# Patient Record
Sex: MALE | Race: WHITE | NOT HISPANIC OR LATINO | Employment: OTHER | URBAN - METROPOLITAN AREA
[De-identification: names, ages, dates, MRNs, and addresses within clinical notes are randomized per-mention and may not be internally consistent; named-entity substitution may affect disease eponyms.]

---

## 2017-01-12 ENCOUNTER — GENERIC CONVERSION - ENCOUNTER (OUTPATIENT)
Dept: OTHER | Facility: OTHER | Age: 67
End: 2017-01-12

## 2017-02-03 ENCOUNTER — ALLSCRIPTS OFFICE VISIT (OUTPATIENT)
Dept: OTHER | Facility: OTHER | Age: 67
End: 2017-02-03

## 2017-02-03 ENCOUNTER — LAB REQUISITION (OUTPATIENT)
Dept: LAB | Facility: HOSPITAL | Age: 67
End: 2017-02-03
Payer: MEDICARE

## 2017-02-03 DIAGNOSIS — E11.65 TYPE 2 DIABETES MELLITUS WITH HYPERGLYCEMIA (HCC): ICD-10-CM

## 2017-02-03 LAB
EST. AVERAGE GLUCOSE BLD GHB EST-MCNC: 240 MG/DL
HBA1C MFR BLD: 10 % (ref 4.2–6.3)

## 2017-02-03 PROCEDURE — 83036 HEMOGLOBIN GLYCOSYLATED A1C: CPT | Performed by: FAMILY MEDICINE

## 2017-02-04 ENCOUNTER — GENERIC CONVERSION - ENCOUNTER (OUTPATIENT)
Dept: OTHER | Facility: OTHER | Age: 67
End: 2017-02-04

## 2017-02-07 ENCOUNTER — FOLLOW UP (OUTPATIENT)
Dept: URBAN - METROPOLITAN AREA CLINIC 44 | Facility: CLINIC | Age: 67
End: 2017-02-07

## 2017-02-07 DIAGNOSIS — Z79.4: ICD-10-CM

## 2017-02-07 DIAGNOSIS — E11.3313: ICD-10-CM

## 2017-02-07 PROCEDURE — 2026F EYE IMG VALID EVC RTNOPTHY: CPT

## 2017-02-07 PROCEDURE — G8427 DOCREV CUR MEDS BY ELIG CLIN: HCPCS

## 2017-02-07 PROCEDURE — G8397 DIL MACULA/FUNDUS EXAM/W DOC: HCPCS

## 2017-02-07 PROCEDURE — 67028 INJECTION EYE DRUG: CPT

## 2017-02-07 PROCEDURE — 2024F 7 FLD RTA PHOTO EVC RTNOPTHY: CPT

## 2017-02-07 PROCEDURE — 2022F DILAT RTA XM EVC RTNOPTHY: CPT

## 2017-02-07 PROCEDURE — 4040F PNEUMOC VAC/ADMIN/RCVD: CPT

## 2017-02-07 PROCEDURE — 92012 INTRM OPH EXAM EST PATIENT: CPT | Mod: 25

## 2017-02-07 PROCEDURE — 3072F LOW RISK FOR RETINOPATHY: CPT

## 2017-02-07 PROCEDURE — 5010F MACUL RESULT PHY/QHP MNG DM: CPT

## 2017-02-07 PROCEDURE — G8482 FLU IMMUNIZE ORDER/ADMIN: HCPCS

## 2017-02-07 PROCEDURE — 1036F TOBACCO NON-USER: CPT

## 2017-02-07 PROCEDURE — 92134 CPTRZ OPH DX IMG PST SGM RTA: CPT

## 2017-02-07 PROCEDURE — 2021F DILAT MACULAR EXAM DONE: CPT

## 2017-02-07 ASSESSMENT — VISUAL ACUITY
OD_SC: 20/40-1
OS_SC: 20/40-2

## 2017-02-07 ASSESSMENT — TONOMETRY
OS_IOP_MMHG: 20
OD_IOP_MMHG: 20

## 2017-02-14 ENCOUNTER — PROCEDURE ONLY (OUTPATIENT)
Dept: URBAN - METROPOLITAN AREA CLINIC 44 | Facility: CLINIC | Age: 67
End: 2017-02-14

## 2017-02-14 DIAGNOSIS — Z79.4: ICD-10-CM

## 2017-02-14 DIAGNOSIS — E11.3313: ICD-10-CM

## 2017-02-14 PROCEDURE — 67028 INJECTION EYE DRUG: CPT

## 2017-02-14 ASSESSMENT — TONOMETRY: OS_IOP_MMHG: 19

## 2017-02-14 ASSESSMENT — VISUAL ACUITY: OS_SC: 20/40-1

## 2017-02-21 ENCOUNTER — ALLSCRIPTS OFFICE VISIT (OUTPATIENT)
Dept: OTHER | Facility: OTHER | Age: 67
End: 2017-02-21

## 2017-03-21 ENCOUNTER — FOLLOW UP (OUTPATIENT)
Dept: URBAN - METROPOLITAN AREA CLINIC 44 | Facility: CLINIC | Age: 67
End: 2017-03-21

## 2017-03-21 ENCOUNTER — GENERIC CONVERSION - ENCOUNTER (OUTPATIENT)
Dept: OTHER | Facility: OTHER | Age: 67
End: 2017-03-21

## 2017-03-21 DIAGNOSIS — Z96.1: ICD-10-CM

## 2017-03-21 DIAGNOSIS — E11.3313: ICD-10-CM

## 2017-03-21 DIAGNOSIS — H43.393: ICD-10-CM

## 2017-03-21 DIAGNOSIS — Z79.4: ICD-10-CM

## 2017-03-21 PROCEDURE — G8397 DIL MACULA/FUNDUS EXAM/W DOC: HCPCS

## 2017-03-21 PROCEDURE — 2026F EYE IMG VALID EVC RTNOPTHY: CPT

## 2017-03-21 PROCEDURE — 2021F DILAT MACULAR EXAM DONE: CPT

## 2017-03-21 PROCEDURE — G8427 DOCREV CUR MEDS BY ELIG CLIN: HCPCS

## 2017-03-21 PROCEDURE — 92250 FUNDUS PHOTOGRAPHY W/I&R: CPT

## 2017-03-21 PROCEDURE — G8482 FLU IMMUNIZE ORDER/ADMIN: HCPCS

## 2017-03-21 PROCEDURE — 92014 COMPRE OPH EXAM EST PT 1/>: CPT

## 2017-03-21 PROCEDURE — 5010F MACUL RESULT PHY/QHP MNG DM: CPT

## 2017-03-21 PROCEDURE — 1036F TOBACCO NON-USER: CPT

## 2017-03-21 PROCEDURE — 92134 CPTRZ OPH DX IMG PST SGM RTA: CPT

## 2017-03-21 PROCEDURE — 4040F PNEUMOC VAC/ADMIN/RCVD: CPT

## 2017-03-21 PROCEDURE — 3072F LOW RISK FOR RETINOPATHY: CPT

## 2017-03-21 PROCEDURE — 2024F 7 FLD RTA PHOTO EVC RTNOPTHY: CPT

## 2017-03-21 PROCEDURE — 2022F DILAT RTA XM EVC RTNOPTHY: CPT

## 2017-03-21 PROCEDURE — 92235 FLUORESCEIN ANGRPH MLTIFRAME: CPT

## 2017-03-21 ASSESSMENT — TONOMETRY
OS_IOP_MMHG: 19
OD_IOP_MMHG: 19

## 2017-03-21 ASSESSMENT — VISUAL ACUITY
OS_CC: 20/40
OD_CC: 20/60

## 2017-03-28 ENCOUNTER — FOLLOW UP (OUTPATIENT)
Dept: URBAN - METROPOLITAN AREA CLINIC 44 | Facility: CLINIC | Age: 67
End: 2017-03-28

## 2017-03-28 DIAGNOSIS — E11.3313: ICD-10-CM

## 2017-03-28 DIAGNOSIS — Z79.4: ICD-10-CM

## 2017-03-28 PROCEDURE — 67028 INJECTION EYE DRUG: CPT

## 2017-03-28 ASSESSMENT — VISUAL ACUITY: OD_SC: 20/50

## 2017-03-28 ASSESSMENT — TONOMETRY: OD_IOP_MMHG: 14

## 2017-04-04 ENCOUNTER — PROCEDURE ONLY (OUTPATIENT)
Dept: URBAN - METROPOLITAN AREA CLINIC 44 | Facility: CLINIC | Age: 67
End: 2017-04-04

## 2017-04-04 DIAGNOSIS — Z79.4: ICD-10-CM

## 2017-04-04 DIAGNOSIS — E11.3313: ICD-10-CM

## 2017-04-04 PROCEDURE — 67028 INJECTION EYE DRUG: CPT | Mod: GA,LT

## 2017-04-04 ASSESSMENT — VISUAL ACUITY: OS_SC: 20/40-1

## 2017-04-04 ASSESSMENT — TONOMETRY: OS_IOP_MMHG: 15

## 2017-04-25 ENCOUNTER — GENERIC CONVERSION - ENCOUNTER (OUTPATIENT)
Dept: OTHER | Facility: OTHER | Age: 67
End: 2017-04-25

## 2017-05-02 ENCOUNTER — FOLLOW UP (OUTPATIENT)
Dept: URBAN - METROPOLITAN AREA CLINIC 44 | Facility: CLINIC | Age: 67
End: 2017-05-02

## 2017-05-02 DIAGNOSIS — E11.3313: ICD-10-CM

## 2017-05-02 DIAGNOSIS — Z79.4: ICD-10-CM

## 2017-05-02 PROCEDURE — 2021F DILAT MACULAR EXAM DONE: CPT

## 2017-05-02 PROCEDURE — 1036F TOBACCO NON-USER: CPT

## 2017-05-02 PROCEDURE — G8482 FLU IMMUNIZE ORDER/ADMIN: HCPCS

## 2017-05-02 PROCEDURE — 92012 INTRM OPH EXAM EST PATIENT: CPT

## 2017-05-02 PROCEDURE — 2022F DILAT RTA XM EVC RTNOPTHY: CPT

## 2017-05-02 PROCEDURE — G8427 DOCREV CUR MEDS BY ELIG CLIN: HCPCS

## 2017-05-02 PROCEDURE — 2026F EYE IMG VALID EVC RTNOPTHY: CPT

## 2017-05-02 PROCEDURE — 4040F PNEUMOC VAC/ADMIN/RCVD: CPT

## 2017-05-02 PROCEDURE — 5010F MACUL RESULT PHY/QHP MNG DM: CPT

## 2017-05-02 PROCEDURE — G8397 DIL MACULA/FUNDUS EXAM/W DOC: HCPCS

## 2017-05-02 PROCEDURE — 92134 CPTRZ OPH DX IMG PST SGM RTA: CPT

## 2017-05-02 PROCEDURE — 2024F 7 FLD RTA PHOTO EVC RTNOPTHY: CPT

## 2017-05-02 ASSESSMENT — VISUAL ACUITY
OS_SC: 20/40-1
OD_SC: 20/60-1

## 2017-05-02 ASSESSMENT — TONOMETRY
OS_IOP_MMHG: 20
OD_IOP_MMHG: 20

## 2017-05-16 ENCOUNTER — FOLLOW UP (OUTPATIENT)
Dept: URBAN - METROPOLITAN AREA CLINIC 44 | Facility: CLINIC | Age: 67
End: 2017-05-16

## 2017-05-16 DIAGNOSIS — Z79.4: ICD-10-CM

## 2017-05-16 DIAGNOSIS — E11.3313: ICD-10-CM

## 2017-05-16 PROCEDURE — 67028 INJECTION EYE DRUG: CPT

## 2017-05-16 ASSESSMENT — TONOMETRY: OD_IOP_MMHG: 18

## 2017-05-16 ASSESSMENT — VISUAL ACUITY: OD_SC: 20/60+1

## 2017-05-23 ENCOUNTER — FOLLOW UP (OUTPATIENT)
Dept: URBAN - METROPOLITAN AREA CLINIC 44 | Facility: CLINIC | Age: 67
End: 2017-05-23

## 2017-05-23 DIAGNOSIS — Z79.4: ICD-10-CM

## 2017-05-23 DIAGNOSIS — E11.3313: ICD-10-CM

## 2017-05-23 PROCEDURE — 67028 INJECTION EYE DRUG: CPT

## 2017-05-23 ASSESSMENT — TONOMETRY: OS_IOP_MMHG: 20

## 2017-05-23 ASSESSMENT — VISUAL ACUITY: OS_SC: 20/40-2

## 2017-05-24 ENCOUNTER — GENERIC CONVERSION - ENCOUNTER (OUTPATIENT)
Dept: OTHER | Facility: OTHER | Age: 67
End: 2017-05-24

## 2017-06-21 ENCOUNTER — GENERIC CONVERSION - ENCOUNTER (OUTPATIENT)
Dept: OTHER | Facility: OTHER | Age: 67
End: 2017-06-21

## 2017-07-19 ENCOUNTER — GENERIC CONVERSION - ENCOUNTER (OUTPATIENT)
Dept: OTHER | Facility: OTHER | Age: 67
End: 2017-07-19

## 2017-08-01 ENCOUNTER — FOLLOW UP (OUTPATIENT)
Dept: URBAN - METROPOLITAN AREA CLINIC 44 | Facility: CLINIC | Age: 67
End: 2017-08-01

## 2017-08-01 DIAGNOSIS — E11.3313: ICD-10-CM

## 2017-08-01 DIAGNOSIS — Z79.4: ICD-10-CM

## 2017-08-01 PROCEDURE — 2024F 7 FLD RTA PHOTO EVC RTNOPTHY: CPT

## 2017-08-01 PROCEDURE — G8397 DIL MACULA/FUNDUS EXAM/W DOC: HCPCS

## 2017-08-01 PROCEDURE — 2022F DILAT RTA XM EVC RTNOPTHY: CPT

## 2017-08-01 PROCEDURE — 2026F EYE IMG VALID EVC RTNOPTHY: CPT

## 2017-08-01 PROCEDURE — G8482 FLU IMMUNIZE ORDER/ADMIN: HCPCS

## 2017-08-01 PROCEDURE — 67028 INJECTION EYE DRUG: CPT

## 2017-08-01 PROCEDURE — 5010F MACUL RESULT PHY/QHP MNG DM: CPT

## 2017-08-01 PROCEDURE — 92134 CPTRZ OPH DX IMG PST SGM RTA: CPT

## 2017-08-01 PROCEDURE — G8427 DOCREV CUR MEDS BY ELIG CLIN: HCPCS

## 2017-08-01 PROCEDURE — 92012 INTRM OPH EXAM EST PATIENT: CPT | Mod: 25

## 2017-08-01 PROCEDURE — 4040F PNEUMOC VAC/ADMIN/RCVD: CPT

## 2017-08-01 PROCEDURE — 1036F TOBACCO NON-USER: CPT

## 2017-08-01 PROCEDURE — 2021F DILAT MACULAR EXAM DONE: CPT

## 2017-08-01 ASSESSMENT — VISUAL ACUITY
OS_SC: 20/30
OD_SC: 20/60

## 2017-08-01 ASSESSMENT — TONOMETRY
OS_IOP_MMHG: 21
OD_IOP_MMHG: 20

## 2017-08-02 ENCOUNTER — ALLSCRIPTS OFFICE VISIT (OUTPATIENT)
Dept: OTHER | Facility: OTHER | Age: 67
End: 2017-08-02

## 2017-08-02 LAB — HBA1C MFR BLD HPLC: 9.7 %

## 2017-08-07 ENCOUNTER — GENERIC CONVERSION - ENCOUNTER (OUTPATIENT)
Dept: OTHER | Facility: OTHER | Age: 67
End: 2017-08-07

## 2017-08-08 ENCOUNTER — PROCEDURE ONLY (OUTPATIENT)
Dept: URBAN - METROPOLITAN AREA CLINIC 44 | Facility: CLINIC | Age: 67
End: 2017-08-08

## 2017-08-08 DIAGNOSIS — E11.3313: ICD-10-CM

## 2017-08-08 DIAGNOSIS — Z79.4: ICD-10-CM

## 2017-08-08 PROCEDURE — 67028 INJECTION EYE DRUG: CPT

## 2017-08-08 ASSESSMENT — VISUAL ACUITY: OS_SC: 20/40-2

## 2017-08-08 ASSESSMENT — TONOMETRY: OS_IOP_MMHG: 18

## 2017-08-30 ENCOUNTER — GENERIC CONVERSION - ENCOUNTER (OUTPATIENT)
Dept: OTHER | Facility: OTHER | Age: 67
End: 2017-08-30

## 2017-09-07 ENCOUNTER — GENERIC CONVERSION - ENCOUNTER (OUTPATIENT)
Dept: OTHER | Facility: OTHER | Age: 67
End: 2017-09-07

## 2017-09-13 ENCOUNTER — GENERIC CONVERSION - ENCOUNTER (OUTPATIENT)
Dept: OTHER | Facility: OTHER | Age: 67
End: 2017-09-13

## 2017-09-27 ENCOUNTER — GENERIC CONVERSION - ENCOUNTER (OUTPATIENT)
Dept: OTHER | Facility: OTHER | Age: 67
End: 2017-09-27

## 2017-09-28 ENCOUNTER — GENERIC CONVERSION - ENCOUNTER (OUTPATIENT)
Dept: OTHER | Facility: OTHER | Age: 67
End: 2017-09-28

## 2017-10-02 ENCOUNTER — GENERIC CONVERSION - ENCOUNTER (OUTPATIENT)
Dept: OTHER | Facility: OTHER | Age: 67
End: 2017-10-02

## 2017-10-11 ENCOUNTER — GENERIC CONVERSION - ENCOUNTER (OUTPATIENT)
Dept: OTHER | Facility: OTHER | Age: 67
End: 2017-10-11

## 2017-10-16 ENCOUNTER — ALLSCRIPTS OFFICE VISIT (OUTPATIENT)
Dept: OTHER | Facility: OTHER | Age: 67
End: 2017-10-16

## 2017-10-17 ENCOUNTER — FOLLOW UP (OUTPATIENT)
Dept: URBAN - METROPOLITAN AREA CLINIC 44 | Facility: CLINIC | Age: 67
End: 2017-10-17

## 2017-10-17 ENCOUNTER — GENERIC CONVERSION - ENCOUNTER (OUTPATIENT)
Dept: OTHER | Facility: OTHER | Age: 67
End: 2017-10-17

## 2017-10-17 DIAGNOSIS — E11.3313: ICD-10-CM

## 2017-10-17 DIAGNOSIS — H43.393: ICD-10-CM

## 2017-10-17 DIAGNOSIS — Z96.1: ICD-10-CM

## 2017-10-17 DIAGNOSIS — Z79.4: ICD-10-CM

## 2017-10-17 PROCEDURE — 5010F MACUL RESULT PHY/QHP MNG DM: CPT

## 2017-10-17 PROCEDURE — 92014 COMPRE OPH EXAM EST PT 1/>: CPT | Mod: 25

## 2017-10-17 PROCEDURE — 2022F DILAT RTA XM EVC RTNOPTHY: CPT

## 2017-10-17 PROCEDURE — 92235 FLUORESCEIN ANGRPH MLTIFRAME: CPT

## 2017-10-17 PROCEDURE — G8482 FLU IMMUNIZE ORDER/ADMIN: HCPCS

## 2017-10-17 PROCEDURE — 1036F TOBACCO NON-USER: CPT

## 2017-10-17 PROCEDURE — G8397 DIL MACULA/FUNDUS EXAM/W DOC: HCPCS

## 2017-10-17 PROCEDURE — 4040F PNEUMOC VAC/ADMIN/RCVD: CPT

## 2017-10-17 PROCEDURE — G8427 DOCREV CUR MEDS BY ELIG CLIN: HCPCS

## 2017-10-17 PROCEDURE — 2024F 7 FLD RTA PHOTO EVC RTNOPTHY: CPT

## 2017-10-17 PROCEDURE — 2021F DILAT MACULAR EXAM DONE: CPT

## 2017-10-17 PROCEDURE — 67028 INJECTION EYE DRUG: CPT

## 2017-10-17 PROCEDURE — 92134 CPTRZ OPH DX IMG PST SGM RTA: CPT

## 2017-10-17 PROCEDURE — 2026F EYE IMG VALID EVC RTNOPTHY: CPT

## 2017-10-17 ASSESSMENT — VISUAL ACUITY
OS_SC: 20/40+1
OD_SC: 20/60

## 2017-10-17 ASSESSMENT — TONOMETRY
OS_IOP_MMHG: 21
OD_IOP_MMHG: 21

## 2017-10-17 NOTE — PROGRESS NOTES
Assessment  1  Allergic dermatitis (692 9) (L23 9)   2  Controlled diabetes mellitus with ophthalmic complication (270 91) (Q49 56)   3  Never a smoker   4  BMI 40 0-44 9, adult (V85 41) (Z68 41)   5  Benign essential hypertension (401 1) (I10)    Plan  Allergic dermatitis    · HydrOXYzine HCl - 25 MG Oral Tablet; TAKE 1/2 TO 1 TABLET BY MOUTH 3  TIMES DAILY AS NEEDED   · Triamcinolone Acetonide 0 1 % External Cream; APPLY A THIN LAYER TO  AFFECTED AREA(S) TWICE DAILY    Discussion/Summary    1) diabetes type 2 CAN GET HBA1C 11/2, patient is anxious to get repeat due to adjustments in diet and recent weight loss  allergic rash: start PREDNISONE 10MG 4 TABS FOR 2 DAYS, 3 TABS FOR 2 DAYS , THEN 2 TABS FOR 2 DAYS, 1 TAB FOR 2 DAYS  itching: ATARAX 1/2-1 TAB 3 TIMES A DAY OK FOR 2 TABS AT BEDTIME IF NEEDEDkeep COOL TRIAMCINOLONE CREAM APPLY TWICE A DAY, to worst areasCHECK COUMADIN LEVEL ON WEDNESDAY 10/18 - IF OVER 3 WHEN CHECK - HOLD COUMADIN AND CALL OFFICEwill hold on influenza immunization until rash resolveshypertension, elevated, may need medication adjustment, continue to monitor  Possible side effects of new medications were reviewed with the patient/guardian today  The treatment plan was reviewed with the patient/guardian  The patient/guardian understands and agrees with the treatment plan      Chief Complaint  PT C/O A RASH ALL OVER THIS ARMS, LEG, AND TORSO SINCE LAST WEEK  PT STATES IS IS VERY ITCHY AND HE HAS TRIED OTC MEDS BUT NOT RELIEF WAS GIVEN  PT WILL SCHEDULE FOR HER MEDICARE WELLNESS  History of Present Illness  HPI: STARTED LAST WEEK WITH RASH ON ARMS, now on abdomen and right leg  Rash is very itchy   has tried BENADRYL AND CORTISONE 10 over the counter without reliefalso tried ZYRTEC DAILY  Patient was mowing the grass this weekend on a riding mower  He denies any new medications  He denies any new soaps lotions or detergents  on scheduling bariatric surgery for gastric bypass HAD CARDIOLOGY AND PULMONOLOGY EVALUATION, 10/19 APPT WITH SURGEON      Review of Systems    Constitutional: recent 15 lb weight loss, but-- as noted in HPI    ENT: no complaints of earache, no loss of hearing, no nosebleeds or nasal discharge, no sore throat or hoarseness  Cardiovascular: no complaints of slow or fast heart rate, no chest pain, no palpitations, no leg claudication or lower extremity edema  Respiratory: no complaints of shortness of breath, no wheezing or cough, no dyspnea on exertion, no orthopnea or PND  Genitourinary: no complaints of dysuria or incontinence, no hesitancy, no nocturia, no genital lesion, no inadequacy of penile erection  Integumentary: a rash, but-- as noted in HPI  Neurological: no complaints of headache, no confusion, no numbness or tingling, no dizziness or fainting  Active Problems  1  Amputation of leg (V49 70) (Z89 619)   2  Benign essential hypertension (401 1) (I10)   3  Controlled diabetes mellitus with ophthalmic complication (331 64) (B14 16)   4  Diabetes mellitus with neurological manifestation (250 60) (E11 49)   5  Diabetes type 2, uncontrolled (250 02) (E11 65)   6  Diabetic retinopathy, nonproliferative, moderate (250 50,362 05) (A21 3209)   7  Diastolic congestive heart failure (428 30,428 0) (I50 30)   8  Edema (782 3) (R60 9)   9  Gout (274 9) (M10 9)   10  History of acute congestive heart failure (V12 59) (Z86 79)   11  History of acute renal failure (V13 09) (Z87 448)   12  History of below knee amputation, left (V49 75) (Z89 512)   13  Hyperlipidemia (272 4) (E78 5)   14  Long term current use of anticoagulant therapy (V58 61) (Z79 01)   15  Peripheral arterial disease (443 9) (I73 9)   16  Personal history of pulmonary embolism (V12 55) (Z86 711)   17  History of Pulmonary Embolism (V12 51)   18  Uncontrolled diabetes mellitus with complications (151 01) (W29 2,T67 07)    Past Medical History  1   History of Anemia due to acute blood loss (285 1) (D62) 2  History of Cellulitis Of The Left Ankle (682 6)   3  History of Chronic Non-pressure Ulcer Of The Foot (707 1)   4  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 (040 0)   5  History of acute bronchitis (V12 69) (Z87 09)   6  History of acute congestive heart failure (V12 59) (Z86 79)   7  History of acute renal failure (V13 09) (Z87 448)   8  History of acute renal failure (V13 09) (Z87 448)   9  History of angina pectoris (V12 59) (Z86 79)   10  History of duodenal ulcer (V12 79) (Z87 19)   11  History of neck pain (V13 59) (Z87 39)   12  History of Osteomyelitis Of The Metatarsal Bones (730 27)   13  History of Pulmonary Embolism (V12 51)   14  History of Stasis leg ulcer (454 0) (I83 009)  Active Problems And Past Medical History Reviewed: The active problems and past medical history were reviewed and updated today  Family History  Mother    1  Family history of Breast Cancer (V16 3)  Father    2  Family history of Congestive Heart Failure   3  Family history of Thyroid Cancer  Sister    4  Family history of Hodgkin Disease  Family History Reviewed: The family history was reviewed and updated today  Social History   · Never a smoker   · Occupation:   · [employer]  The social history was reviewed and updated today  The social history was reviewed and is unchanged  Surgical History  1  History of Amputation Of Leg Below Knee   2  History of PTA Of Aorta   3  History of Radiologic Supervision: Anisa Filter Placement In IVC  Surgical History Reviewed: The surgical history was reviewed and updated today  Current Meds   1  Allopurinol 300 MG Oral Tablet; TAKE 1 TABLET DAILY AS DIRECTED; Therapy: 13XIE6883 to (Evaluate:30Mar2016)  Requested for: 71Oeg3183; Last   Rx:56Dmc1777 Ordered   2  AmLODIPine Besylate 5 MG Oral Tablet; Take 1 tablet daily as directed; Therapy: 66FNN0266 to (Kathleen Le)  Requested for: 62PAG2540; Last   Rx:23Nov2014 Ordered   3   BD Insulin Syringe U-100 1 ML Miscellaneous; 8units of novolog tid with meals; Therapy: 10EVW4271 to (Last Rx:14Nov2015)  Requested for: 46CDC8341 Ordered   4  CloNIDine HCl - 0 2 MG Oral Tablet; Take 1 tablet twice daily; Therapy: 58ISC2446 to (Evaluate:02Zdb0086)  Requested for: 21Mar2017; Last   Rx:21Mar2017 Ordered   5  Colchicine 0 6 MG Oral Tablet; TAKE 1 TABLET TWICE DAILY WITH FOOD AS NEEDED; Therapy: 89NVF3634 to (Evaluate:10Jan2016)  Requested for: 41Mxt0976; Last   Rx:98Qxh7213 Ordered   6  Furosemide 80 MG Oral Tablet; Therapy: 39Eye2580 to Recorded   7  HydrALAZINE HCl - 50 MG Oral Tablet; PT IS TAKING 75 MG 3 X DAILY; Therapy: 94QPM9743 to  Requested for: 26Pgz7257 Recorded   8  Lantus SoloStar 100 UNIT/ML Subcutaneous Solution Pen-injector; inject 30 units at   bedtime; Therapy: 39HFF2520 to (Brown Ray)  Requested for: 13NWA5164; Last   Rx:03Oct2017 Ordered   9  Meloxicam 7 5 MG Oral Tablet; TAKE ONE TABLET BY MOUTH ONCE DAILY; Therapy: 02LBZ2806 to (Evaluate:30Oct2017)  Requested for: 84Fwj5053; Last   Rx:61Mhs7023 Ordered   10  Multivitamins Oral Capsule; Therapy: (Recorded:37Ros8318) to Recorded   11  NovoFine 32G X 6 MM Miscellaneous; use as directed; Therapy: 75YVI8162 to (Evaluate:13Snn7709)  Requested for: 00Ikc9459; Last    Rx:11Joj8427; Status: ACTIVE - Transmit to [facility] Ordered   12  NovoLOG FlexPen 100 UNIT/ML Subcutaneous Solution Pen-injector; USE 8 UNITS    SUBCUTANEOUSLY THREE TIMES A DAY WITH MEALS AS DIRECTED- DISCARD    PEN 28 DAYS AFTER 1ST USE; Therapy: 31Agk8012 to (Michael Joy)  Requested for: 63DIT3456; Last    Rx:03Oct2017 Ordered   13  Simvastatin 40 MG Oral Tablet; TAKE 1 TABLET EVERY EVENING; Therapy: 43QUX9907 to (Evaluate:14Gur1505)  Requested for: 21Mar2017; Last    Rx:21Mar2017 Ordered   14  Warfarin Sodium 1 MG Oral Tablet; 1 tab every monday     (continue 5mg daily);     Therapy: 64CJJ9418 to (Last Rx:92Mqj1270)  Requested for: 44OGS2861 Ordered   15  Warfarin Sodium 5 MG Oral Tablet; TAKE 1 TABLET (5MG) EVERY DAY EXCEPT TAKE 1    1/2 TABLET (7 5MG) ON MONDAY; Therapy: 08RHP7739 to (Evaluate:41Loo3934)  Requested for: 73Bpr7151; Last    Rx:53Btc4590 Ordered    The medication list was reviewed and updated today  Allergies  1  No Known Drug Allergies    Vitals   Recorded: 97OTA5749 01:38PM   Temperature 98 1 F   Heart Rate 86   Systolic 136   Diastolic 90   Height 5 ft 9 in   Weight 276 lb 4 oz   BMI Calculated 40 8   BSA Calculated 2 37   O2 Saturation 98     Physical Exam    Constitutional   General appearance: Abnormal  -- Obese  Pulmonary   Respiratory effort: No increased work of breathing or signs of respiratory distress  Cardiovascular   Auscultation of heart: Normal rate and rhythm, normal S1 and S2, without murmurs  Examination of extremities for edema and/or varicosities: Normal     Skin   Skin and subcutaneous tissue: Abnormal  -- [place] mild erythema right [place] and right forearm, patchy areas on abdomen and left arm, raised plaques     Psychiatric   Orientation to person, place and time: Normal     Mood and affect: Normal          Signatures   Electronically signed by : Tram Joshi DO; Oct 16 2017 11:40PM EST                       (Author)

## 2017-10-24 ENCOUNTER — PROCEDURE ONLY (OUTPATIENT)
Dept: URBAN - METROPOLITAN AREA CLINIC 44 | Facility: CLINIC | Age: 67
End: 2017-10-24

## 2017-10-24 DIAGNOSIS — E11.3313: ICD-10-CM

## 2017-10-24 DIAGNOSIS — Z79.4: ICD-10-CM

## 2017-10-24 PROCEDURE — 67028 INJECTION EYE DRUG: CPT

## 2017-10-24 ASSESSMENT — VISUAL ACUITY: OS_SC: 20/30-1

## 2017-10-24 ASSESSMENT — TONOMETRY: OS_IOP_MMHG: 22

## 2017-10-25 ENCOUNTER — GENERIC CONVERSION - ENCOUNTER (OUTPATIENT)
Dept: OTHER | Facility: OTHER | Age: 67
End: 2017-10-25

## 2017-11-03 ENCOUNTER — GENERIC CONVERSION - ENCOUNTER (OUTPATIENT)
Dept: OTHER | Facility: OTHER | Age: 67
End: 2017-11-03

## 2017-11-08 ENCOUNTER — GENERIC CONVERSION - ENCOUNTER (OUTPATIENT)
Dept: OTHER | Facility: OTHER | Age: 67
End: 2017-11-08

## 2017-11-22 ENCOUNTER — GENERIC CONVERSION - ENCOUNTER (OUTPATIENT)
Dept: OTHER | Facility: OTHER | Age: 67
End: 2017-11-22

## 2017-11-24 ENCOUNTER — GENERIC CONVERSION - ENCOUNTER (OUTPATIENT)
Dept: OTHER | Facility: OTHER | Age: 67
End: 2017-11-24

## 2017-11-25 ENCOUNTER — ALLSCRIPTS OFFICE VISIT (OUTPATIENT)
Dept: OTHER | Facility: OTHER | Age: 67
End: 2017-11-25

## 2017-11-26 NOTE — PROGRESS NOTES
Assessment    1  Pre-op examination (V72 84) (Z01 818)   2  Diabetes type 2, uncontrolled (250 02) (E11 65)   3  BMI 40 0-44 9, adult (V85 41) (Z68 41)   4  Chronic diastolic congestive heart failure (428 32,428 0) (I50 32)   5  Parathyroid dysfunction (252 9) (E21 5)   6  Never a smoker   7  Benign essential hypertension (401 1) (I10)   8  Hyperlipidemia (272 4) (E78 5)   9  Personal history of pulmonary embolism (V12 55) (Z86 711)   10  Vitamin D deficiency (268 9) (E55 9)   11  Chronic kidney disease (CKD), stage II (mild) (585 2) (N18 2)    Plan  Allergic dermatitis    · Triamcinolone Acetonide 0 1 % External Cream  Gout    · Meloxicam 7 5 MG Oral Tablet    Discussion/Summary  Surgical Clearance: He is at a LOW TO MODERATE risk from a cardiovascular standpoint at this time without any additional cardiac testing  Reevaluation needed, if he should present with symptoms prior to surgery/procedure  Attachments: nuclear study  Surgical clearance faxed to Dr Dee Kenney   1) pt approved for surgerycall cardiology once you get surgical date for date to stop coumadin/bridgestop meloxicam 1 week prior to surgery -ok tylenolup to date with immunizations - had influenza and pneumococcal immunizationsvitamin d deficiency: vitamin d3 5000iu dailyDiabetes type 2: hba1c 8 7chronic kidney disease stage 2: gfr: 37 7 ,slight decrease , last 40 7chronic congestive heart failure: ef 50-55% by recent echo 9/29/2017nuclear stress test done by cardiologyhypercholesterolemia: controlled, continue simvastatinhypertension: will need to continue to monitorelevated PTH, recommend repeat after surgery and endocrine evaluation  The patient was counseled regarding diagnostic results,-- instructions for management,-- prognosis  Possible side effects of new medications were reviewed with the patient/guardian today  The treatment plan was reviewed with the patient/guardian   The patient/guardian understands and agrees with the treatment plan Chief Complaint  PT IS HERE FOR HIS PRE-OP EVALUATION  PT WILL BE HAVING BARIATRIC SURGERY AT [address] [place] [address]  PT HAD PRE-ADMISSION TESTING COMPLETED  FLU VACCINE WAS DONE ON OCTOBER 10, 2017  History of Present Illness  Pre-Op Visit (Brief): The patient is being seen for a preoperative visit-- and-- BARIATRIC SURGERY AT [address] [place] [address]  The procedure is a(n) bariatric surgery with Dr Dorene Hall  The indication for surgery is obesity, type 2 diabetes- uncontrolled,  Surgical Risk Assessment:  Prior Anesthesia: He had prior anesthesia-- and-- no prior adverse reaction to general anesthesia  Pertinent Past Medical History: diabetes type 2  Exercise Capacity: able to walk four blocks without symptoms-- and-- able to walk two flights of stairs without symptoms  Lifestyle Factors: denies alcohol use, denies tobacco use and denies illegal drug use  Symptoms: no symptoms-- and-- dyspnea  STOP questionnaire score is 1  Other RENA risk factors include high BMI,-- male gender,-- large neck circumference-- and-- age over 48  Predicted risk of RENA: Moderate-- and-- obstructive sleep apnea  Pertinent Family History: no pertinent family history  Living Situation: home is secure and supportive and no post-op concerns with his living situation  HPI: pt here for pre-op physical for bariatric surgery  He has uncontrolled diabetes type 2 with history of bka left lower leg  He has been through the evaluations and has been cleared by cardiology and pulmonology  denies chest pain but has some intermittent shortness of breath  He has been trying to lose weight and has lost 17 pounds since august  He was never a smoker and quit alcohol 7 years ago  bms have been more constipated since being on prednisone  He has now stoppedhas been stable  no recent flareshas been taking lantus 30u at bedtime and 8 units of novolog with each meal  his most recent hba1c on 11/1 on 8 7        Review of Systems   Constitutional: recent 17 lb weight loss, but-- as noted in HPI-- and-- no fever  Eyes: No complaints of eye pain, no red eyes, no discharge from eyes, no itchy eyes  ENT: no complaints of earache, no hearing loss, no nosebleeds, no nasal discharge, no sore throat, no hoarseness  Cardiovascular: lower extremity edema, but-- as noted in HPI  Respiratory: No complaints of shortness of breath, no wheezing, no cough, no SOB on exertion, no orthopnea or PND  Gastrointestinal: No complaints of abdominal pain, no constipation, no nausea or vomiting, no diarrhea or bloody stools  Genitourinary: No complaints of dysuria, no incontinence, no hesitancy, no nocturia, no genital lesion, no testicular pain  Musculoskeletal: history bka left lower leg, but-- as noted in HPI  Integumentary: No complaints of skin rash or skin lesions, no itching, no skin wound, no dry skin  Neurological: No compliants of headache, no confusion, no convulsions, no numbness or tingling, no dizziness or fainting, no limb weakness, no difficulty walking  Psychiatric: Is not suicidal, no sleep disturbances, no anxiety or depression, no change in personality, no emotional problems  Endocrine: No complaints of proptosis, no hot flashes, no muscle weakness, no erectile dysfunction, no deepening of the voice, no feelings of weakness  Hematologic/Lymphatic: No complaints of swollen glands, no swollen glands in the neck, does not bleed easily, no easy bruising  Active Problems  1  Allergic dermatitis (692 9) (L23 9)   2  Amputation of leg (V49 70) (Z89 619)   3  Benign essential hypertension (401 1) (I10)   4  BMI 40 0-44 9, adult (V85 41) (Z68 41)   5  Controlled diabetes mellitus with ophthalmic complication (129 75) (B05 32)   6  Diabetes mellitus with neurological manifestation (250 60) (E11 49)   7  Diabetes type 2, uncontrolled (250 02) (E11 65)   8   Diabetic retinopathy, nonproliferative, moderate (250 50,362 05) (R40 1475) 9  Diastolic congestive heart failure (428 30,428 0) (I50 30)   10  Edema (782 3) (R60 9)   11  Gout (274 9) (M10 9)   12  History of acute congestive heart failure (V12 59) (Z86 79)   13  History of acute renal failure (V13 09) (Z87 448)   14  History of below knee amputation, left (V49 75) (Z89 512)   15  Hyperlipidemia (272 4) (E78 5)   16  Long term current use of anticoagulant therapy (V58 61) (Z79 01)   17  Peripheral arterial disease (443 9) (I73 9)   18  Personal history of pulmonary embolism (V12 55) (Z86 711)   19  History of Pulmonary Embolism (V12 51)   20  Uncontrolled diabetes mellitus with complications (075 86) (M85 0,Z08 80)    Past Medical History   · History of Anemia due to acute blood loss (285 1) (D62)   · History of Cellulitis Of The Left Ankle (682 6)   · History of Chronic Non-pressure Ulcer Of The Foot (707 1)   · 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 (040 0)   · History of acute bronchitis (V12 69) (Z87 09)   · History of acute congestive heart failure (V12 59) (Z86 79)   · History of acute renal failure (V13 09) (Z87 448)   · History of acute renal failure (V13 09) (Z87 448)   · History of angina pectoris (V12 59) (Z86 79)   · History of duodenal ulcer (V12 79) (Z87 19)   · History of neck pain (V13 59) (Z87 39)   · History of Osteomyelitis Of The Metatarsal Bones (730 27)   · History of Pulmonary Embolism (V12 51)   · History of Stasis leg ulcer (454 0) (I83 009)    The active problems and past medical history were reviewed and updated today  Surgical History   · History of Amputation Of Leg Below Knee   · History of PTA Of Aorta   · History of Radiologic Supervision: [place] Filter Placement In IVC    The surgical history was reviewed and updated today         Family History  Mother    · Family history of Breast Cancer (V16 3)  Father    · Family history of Congestive Heart Failure   · Family history of Thyroid Cancer  Sister    · Family history of Hodgkin Disease    The family history was reviewed and updated today  Social History     · Never a smoker   · 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  The social history was reviewed and updated today  The social history was reviewed and is unchanged  Current Meds   1  Allopurinol 300 MG Oral Tablet; TAKE 1 TABLET DAILY AS DIRECTED; Therapy: 98YWW4393 to (Evaluate:30Mar2016)  Requested for: 82Zef2547; Last Rx:11Tuj2814 Ordered   2  AmLODIPine Besylate 5 MG Oral Tablet; Take 1 tablet daily as directed; Therapy: 81RHU5385 to (Norma Sanchez)  Requested for: 80IKH9517; Last Rx:23Nov2014 Ordered   3  BD Insulin Syringe U-100 1 ML Miscellaneous; 8units of novolog tid with meals; Therapy: 42VDG4654 to (Last Rx:14Nov2015)  Requested for: 26GUV5193 Ordered   4  CloNIDine HCl - 0 2 MG Oral Tablet; Take 1 tablet twice daily; Therapy: 24LLI4962 to (Evaluate:19Upc6462)  Requested for: 21Mar2017; Last Rx:21Mar2017 Ordered   5  Furosemide 80 MG Oral Tablet; Therapy: 16Ujp4014 to Recorded   6  HydrALAZINE HCl - 50 MG Oral Tablet; PT IS TAKING 75 MG 3 X DAILY; Therapy: 04PPB0540 to  Requested for: 44Eqd1926 Recorded   7  Lantus SoloStar 100 UNIT/ML Subcutaneous Solution Pen-injector; inject 30 units at bedtime; Therapy: 37CWR3623 to (Linnea Thompson)  Requested for: 11ULC9980; Last Rx:03Oct2017 Ordered   8  Meloxicam 7 5 MG Oral Tablet; TAKE ONE TABLET BY MOUTH ONCE DAILY; Therapy: 94PHJ6466 to (Evaluate:30Oct2017)  Requested for: 17Ejv5042; Last Rx:80Nrz7557 Ordered   9  Multivitamins Oral Capsule; Therapy: (Recorded:76Aix0722) to Recorded   10  NovoFine 32G X 6 MM Miscellaneous; use as directed; Therapy: 54VQS7877 to (Evaluate:95Bhu9555)  Requested for: 24Mpa9378; Last  Rx:48Vsq6736; Status[facility] Ordered   11  NovoLOG FlexPen 100 UNIT/ML Subcutaneous Solution Pen-injector; USE 8 UNITS  SUBCUTANEOUSLY THREE TIMES A DAY WITH MEALS AS DIRECTED- DISCARD  PEN 28 DAYS AFTER 1ST USE;   Therapy: 10Gna1581 to (Ronald Marcellusky)  Requested for: 48SPU5722; Last  Rx:03Oct2017 Ordered   12  Simvastatin 40 MG Oral Tablet; TAKE 1 TABLET EVERY EVENING; Therapy: 07OYP5537 to (Evaluate:13Jna1251)  Requested for: 21Mar2017; Last  Rx:21Mar2017 Ordered   13  Triamcinolone Acetonide 0 1 % External Cream; APPLY A THIN LAYER TO AFFECTED  AREA(S) TWICE DAILY; Therapy: 65IML9841 to [phone]  Requested for: 98RBE0174; Last  Rx:16Oct2017 Ordered   14  Warfarin Sodium 1 MG Oral Tablet; 1 tab every monday   (continue 5mg daily); Therapy: 47KZM6477 to (Last Rx:00Qdn9818)  Requested for: 41SDC3052 Ordered   15  Warfarin Sodium 5 MG Oral Tablet; TAKE 1 TABLET (5MG) EVERY DAY EXCEPT TAKE 1  1/2 TABLET (7 5MG) ON MONDAY; Therapy: 45NAL8828 to (Evaluate:59Jqz7986)  Requested for: 87Nkl8724; Last  Rx:03Smi0985 Ordered    The medication list was reviewed and updated today  Allergies  1  No Known Drug Allergies    Vitals   Recorded: 39FTS1336 12:52PM Recorded: 80LRY1526 08:16AM   Temperature  97 F   Heart Rate  936   Systolic 127 661   Diastolic 80 86   Height  5 ft 9 in   Weight  274 lb    BMI Calculated  40 46   BSA Calculated  2 36   O2 Saturation  97       Physical Exam   Constitutional  General appearance: Abnormal  -- obese  Head and Face  Head and face: Normal    Palpation of the face and sinuses: No sinus tenderness  Eyes  Conjunctiva and lids: No erythema, swelling or discharge  Pupils and irises: Equal, round, reactive to light  Ears, Nose, Mouth, and Throat  External inspection of ears and nose: Normal    Otoscopic examination: Tympanic membranes translucent with normal light reflex  Canals patent without erythema  Hearing: Normal    Nasal mucosa, septum, and turbinates: Normal without edema or erythema  Lips, teeth, and gums: Normal, good dentition  Oropharynx: Normal with no erythema, edema, exudate or lesions  Neck  Neck: Supple, symmetric, trachea midline, no masses     Thyroid: Normal, no thyromegaly  Pulmonary  Respiratory effort: No increased work of breathing or signs of respiratory distress  Auscultation of lungs: Clear to auscultation  Cardiovascular  Auscultation of heart: Normal rate and rhythm, normal S1 and S2, no murmurs  Examination of extremities for edema and/or varicosities: Abnormal  -- mild lower extremity edema right lower extremity  Abdomen  Abdomen: Non-tender, no masses  Liver and spleen: No hepatomegaly or splenomegaly  Results/Data  Diabetes Flow Sheet 34YVZ2282 11:33AM      Test Name Result Flag Reference   LDL CHOLESTEROL CALCULATED 109       (1) GLUCOSE,  FASTING 87PQG1510 11:31AM      Test Name Result Flag Reference   GLUCOSE FASTING 195 H        No acute ischemia  Rhythm and rate: ventricular rate is 56 beats per minute-- and-- sinus bradycardia  WV Interval: first degree heart block  (1) HEMOGLOBIN A1C 01RYA3359 03:03PM Paty Alvarado     Test Name Result Flag Reference   HEMOGLOBIN A1C 8 7       End of Encounter Meds    1  AmLODIPine Besylate 5 MG Oral Tablet; Take 1 tablet daily as directed; Therapy: 96MKV5972 to (Memory Sleeper)  Requested for: 93TPA6242; Last Rx:23Nov2014 Ordered   2  CloNIDine HCl - 0 2 MG Oral Tablet; Take 1 tablet twice daily; Therapy: 69YBB7973 to (Evaluate:67Woa3387)  Requested for: 21Mar2017; Last Rx:21Mar2017 Ordered   3  HydrALAZINE HCl - 50 MG Oral Tablet; PT IS TAKING 75 MG 3 X DAILY; Therapy: 51IUR2597 to  Requested for: 03Fff3837 Recorded    4  BD Insulin Syringe U-100 1 ML Miscellaneous; 8units of novolog tid with meals; Therapy: 52DTG1000 to (Last Rx:14Nov2015)  Requested for: 58WLK5336 Ordered    5  Lantus SoloStar 100 UNIT/ML Subcutaneous Solution Pen-injector; inject 30 units at bedtime; Therapy: 16EZT1478 to (Derald Ano)  Requested for: 63JLF4950; Last Rx:03Oct2017 Ordered   6  NovoFine 32G X 6 MM Miscellaneous; use as directed;  Therapy: 21MUC6583 to (Evaluate:87Oke7301)  Requested for: 74Oye8447; Last Rx: 90PPE4565; Status: ACTIVE - Transmit to Pharmacy - Awaiting Verification Ordered   7  NovoLOG FlexPen 100 UNIT/ML Subcutaneous Solution Pen-injector; USE 8 UNITS SUBCUTANEOUSLY THREE TIMES A DAY WITH MEALS AS DIRECTED- DISCARD PEN 28 DAYS AFTER 1ST USE; Therapy: 33Bwk9776 to (Justice Cohn)  Requested for: 17TIE9031; Last Rx:03Oct2017 Ordered    8  Warfarin Sodium 5 MG Oral Tablet; TAKE 1 TABLET (5MG) EVERY DAY EXCEPT TAKE 1 1/2 TABLET (7 5MG) ON MONDAY; Therapy: 62KNH9537 to (Evaluate:38Qnj2047)  Requested for: 28Qih2720; Last Rx:67Itz1028 Ordered    9  Allopurinol 300 MG Oral Tablet; TAKE 1 TABLET DAILY AS DIRECTED; Therapy: 30UGE7373 to (Evaluate:30Mar2016)  Requested for: 37Fuv3110; Last Rx:51Tna8251 Ordered    10  Multivitamins Oral Capsule; Therapy: (Recorded:80Pyu9054) to Recorded    11  Simvastatin 40 MG Oral Tablet; TAKE 1 TABLET EVERY EVENING; Therapy: 13JCG3327 to (Evaluate:38Stk4144)  Requested for: 21Mar2017; Last  Rx:21Mar2017 Ordered    12  Warfarin Sodium 1 MG Oral Tablet; 1 tab every monday   (continue 5mg daily); Therapy: 74EBW0762 to (Last Rx:33Xlb9936)  Requested for: 15DQD4822 Ordered    13  Furosemide 80 MG Oral Tablet;   Therapy: 25VKD3509 to Recorded    Signatures   Electronically signed by : Gold Irby DO; Nov 25 2017  1:31PM EST                       (Author)

## 2017-11-28 ENCOUNTER — FOLLOW UP (OUTPATIENT)
Dept: URBAN - METROPOLITAN AREA CLINIC 44 | Facility: CLINIC | Age: 67
End: 2017-11-28

## 2017-11-28 DIAGNOSIS — Z79.4: ICD-10-CM

## 2017-11-28 DIAGNOSIS — E11.3313: ICD-10-CM

## 2017-11-28 PROCEDURE — 92134 CPTRZ OPH DX IMG PST SGM RTA: CPT

## 2017-11-28 PROCEDURE — 92012 INTRM OPH EXAM EST PATIENT: CPT | Mod: 25

## 2017-11-28 PROCEDURE — 67028 INJECTION EYE DRUG: CPT

## 2017-11-28 ASSESSMENT — VISUAL ACUITY
OD_SC: 20/70+1
OS_SC: 20/40

## 2017-11-28 ASSESSMENT — TONOMETRY
OD_IOP_MMHG: 17
OS_IOP_MMHG: 22

## 2017-12-05 ENCOUNTER — PROCEDURE ONLY (OUTPATIENT)
Dept: URBAN - METROPOLITAN AREA CLINIC 44 | Facility: CLINIC | Age: 67
End: 2017-12-05

## 2017-12-05 DIAGNOSIS — E11.3313: ICD-10-CM

## 2017-12-05 DIAGNOSIS — Z79.4: ICD-10-CM

## 2017-12-05 PROCEDURE — 67028 INJECTION EYE DRUG: CPT

## 2017-12-05 ASSESSMENT — VISUAL ACUITY: OS_SC: 20/25

## 2017-12-05 ASSESSMENT — TONOMETRY: OS_IOP_MMHG: 23

## 2017-12-06 ENCOUNTER — GENERIC CONVERSION - ENCOUNTER (OUTPATIENT)
Dept: OTHER | Facility: OTHER | Age: 67
End: 2017-12-06

## 2017-12-07 ENCOUNTER — GENERIC CONVERSION - ENCOUNTER (OUTPATIENT)
Dept: FAMILY MEDICINE CLINIC | Facility: CLINIC | Age: 67
End: 2017-12-07

## 2017-12-07 ENCOUNTER — GENERIC CONVERSION - ENCOUNTER (OUTPATIENT)
Dept: OTHER | Facility: OTHER | Age: 67
End: 2017-12-07

## 2018-01-03 ENCOUNTER — GENERIC CONVERSION - ENCOUNTER (OUTPATIENT)
Dept: FAMILY MEDICINE CLINIC | Facility: CLINIC | Age: 68
End: 2018-01-03

## 2018-01-03 ENCOUNTER — GENERIC CONVERSION - ENCOUNTER (OUTPATIENT)
Dept: OTHER | Facility: OTHER | Age: 68
End: 2018-01-03

## 2018-01-04 ENCOUNTER — GENERIC CONVERSION - ENCOUNTER (OUTPATIENT)
Dept: FAMILY MEDICINE CLINIC | Facility: CLINIC | Age: 68
End: 2018-01-04

## 2018-01-09 NOTE — RESULT NOTES
Verified Results  Coumadin Flow Sheet [id number] 02:03PM VahidRoboteX     Test Name Result Flag Reference   Recheck INR [id number]     New Dose      7 5 mg m, and 5mg other days

## 2018-01-09 NOTE — RESULT NOTES
Verified Results  Coumadin Flow Sheet 66Weh8084 10:19AM Bruce Pelayo     Test Name Result Flag Reference   Recheck INR [id number]     New Dose 5mg daily

## 2018-01-10 NOTE — RESULT NOTES
Verified Results  (1) URIC ACID 25Jan2016 12:00AM Kalyani      Test Name Result Flag Reference   URIC ACID 6 5 mg/dL  4 2-8 0

## 2018-01-10 NOTE — RESULT NOTES
Verified Results  Coumadin Flow Sheet [id number] 01:09PM Stefani Cisneros     Test Name Result Flag Reference   Recheck INR [id number]     New Dose 5mg daily

## 2018-01-10 NOTE — RESULT NOTES
Verified Results  Coumadin Flow Sheet [id number] 10:48AM Wanda Woodard     Test Name Result Flag Reference   Recheck INR [id number]     New Dose      7 5MG TODAY THEN 5MG DAILY

## 2018-01-10 NOTE — RESULT NOTES
Verified Results  Coumadin Flow Sheet [id number] 12:44PM Dorthea Batch     Test Name Result Flag Reference   Recheck INR [id number]     New Dose      7 5mg today then 5mg daily

## 2018-01-11 NOTE — RESULT NOTES
Verified Results  Coumadin Flow Sheet [id number] 05:03PM Devi Rodríguez     Test Name Result Flag Reference   INR 1 3     Recheck INR [id number]     Current Dose      2 5mg m, f, 5mg other days   New Dose      2 5mg m and 5mg other days

## 2018-01-11 NOTE — RESULT NOTES
Verified Results  (1) PSA FREE & TOTAL [id number] 09:00AM Amber Sanchez     Test Name Result Flag Reference   PSA, FREE 0 40 ng/mL  N/A   Roche ECLIA methodology  % FREE PSA 50 0 %     The table below lists the probability of prostate cancer for  men with non-suspicious ANA results and total PSA between  4 and 10 ng/mL, by patient age Ann-Marie Chery, [address] [place] [address],  [phone]:[id number])  % Free PSA       50-64 yr        65-75 yr                    0 00-10 00%        56%             55%                   10 01-15 00%        24%             35%                   15 01-20 00%        17%             23%                   20 01-25 00%        10%             20%                        >25 00%         5%              9%  Please note:  Helen et al did not make specific                recommendations regarding the use of                percent free PSA for any other population                of men  Performed at:  [address] [place] [address]  [id number]  : Indu Wharton MD, Phone:  [phone]   PROSTATE SPECIFIC ANTIGEN TOTAL 0 8 ng/mL  0 0 - 4 0   Roche ECLIA methodology  According to the American Urological Association, Serum PSA should  decrease and remain at undetectable levels after radical  prostatectomy  The AUA defines biochemical recurrence as an initial  PSA value 0 2 ng/mL or greater followed by a subsequent confirmatory  PSA value 0 2 ng/mL or greater  Values obtained with different assay methods or kits cannot be used  interchangeably  Results cannot be interpreted as absolute evidence  of the presence or absence of malignant disease

## 2018-01-11 NOTE — RESULT NOTES
Verified Results  Coumadin Flow Sheet [id number] 04:55PM Amber Sanchez     Test Name Result Flag Reference   Recheck INR [id number]     New Dose      7 5mg m and 5mg all other days

## 2018-01-11 NOTE — RESULT NOTES
Verified Results  Coumadin Flow Sheet [id number] 05:24PM Anna Estrada     Test Name Result Flag Reference   Recheck INR [id number]     New Dose      7 5mg m,f and 5mg other days

## 2018-01-11 NOTE — RESULT NOTES
Verified Results  Coumadin Flow Sheet [id number] 07:15AM Anna Estrada     Test Name Result Flag Reference   INR 2 1     Recheck INR [id number]     Current Dose      2 5mg/5mg alternating   New Dose      2 5mg/5mg alternating

## 2018-01-12 NOTE — RESULT NOTES
Verified Results  Coumadin Flow Sheet [id number] 03:27PM Juliane Hull     Test Name Result Flag Reference   INR 1 9     Recheck INR [id number]     Current Dose      2  5 mg/5mg alternating   New Dose      5mgfor 2 days then alternate 2 5mg/ 5mg       Plan  Diabetes mellitus with neurological manifestation    · NovoLOG FlexPen 100 UNIT/ML Subcutaneous Solution Pen-injector; INJECT 8  UNITS SUBCUTANEOUSLY THREE TIMES DAILY WITH MEALS AS DIRECTED (PEN  EXPIRES 28 DAYS AFTER FIRST USE)

## 2018-01-12 NOTE — RESULT NOTES
Verified Results  Coumadin Flow Sheet [id number] 05:21PM Jeanne Parcel     Test Name Result Flag Reference   Recheck INR [id number]     New Dose      5mg/2 5mg alternating

## 2018-01-12 NOTE — RESULT NOTES
Verified Results  Coumadin Flow Sheet [id number] 05:14PM Juliane Hull     Test Name Result Flag Reference   Recheck INR [id number]     New Dose      2 5mg m,f and 5mg other days

## 2018-01-12 NOTE — RESULT NOTES
Verified Results  Coumadin Flow Sheet [id number] 09:56AM Mahogany Diaz     Test Name Result Flag Reference   Recheck INR [id number]     New Dose      hold 3 days then restart 5mg daily

## 2018-01-13 VITALS
HEIGHT: 69 IN | DIASTOLIC BLOOD PRESSURE: 75 MMHG | WEIGHT: 291 LBS | TEMPERATURE: 96.9 F | OXYGEN SATURATION: 98 % | BODY MASS INDEX: 43.1 KG/M2 | SYSTOLIC BLOOD PRESSURE: 140 MMHG | HEART RATE: 52 BPM

## 2018-01-13 VITALS
WEIGHT: 276.25 LBS | DIASTOLIC BLOOD PRESSURE: 90 MMHG | TEMPERATURE: 98.1 F | HEIGHT: 69 IN | SYSTOLIC BLOOD PRESSURE: 150 MMHG | OXYGEN SATURATION: 98 % | HEART RATE: 86 BPM | BODY MASS INDEX: 40.91 KG/M2

## 2018-01-13 VITALS
SYSTOLIC BLOOD PRESSURE: 130 MMHG | DIASTOLIC BLOOD PRESSURE: 80 MMHG | OXYGEN SATURATION: 97 % | BODY MASS INDEX: 41.99 KG/M2 | HEART RATE: 60 BPM | TEMPERATURE: 97.6 F | WEIGHT: 283.5 LBS | HEIGHT: 69 IN

## 2018-01-13 NOTE — PROGRESS NOTES
Assessment    1  Medicare annual wellness visit, initial (V70 0) (Z00 00)   2  Never a smoker   3  Diabetes type 2, uncontrolled (250 02) (E11 65)   4  Screening for colon cancer (V76 51) (Z12 11)   5  Benign essential hypertension (401 1) (I10)   6  Screening for neurological condition (V80 09) (Z13 89)   7  Depression screening (V79 0) (Z13 89)   8  Screening for genitourinary condition (V81 6) (Z13 89)    Plan  Acute CHF (congestive heart failure), Acute renal failure, Diabetes mellitus with  ophthalmic manifestations, Diabetic retinopathy, nonproliferative, moderate,  Hyperlipidemia, Lipid screening, Screening for endocrine disorder,  Screening for prostate cancer, Screening, anemia, deficiency, iron, Uncontrolled  diabetes mellitus with complications    · Routine Venipuncture - POC; Status:Complete;   Done: 45Mqs2787  Depression screening    · *VB-Depression Screening; Status:Complete;   Done: 39WUB0979 12:00AM   · *VB-Depression Screening ; every 1 year; Last 37Cgm6117; Next 17ZFC2130;  Status:Active  Diabetes mellitus with neurological manifestation    · (1) MICROALBUMIN CREATININE RATIO, RANDOM URINE; Status:Complete;   Done:  90Xid8834 12:16PM  Lipid screening, Screening for endocrine disorder    · (1) LIPID PANEL, FASTING; Status: In Progress - Specimen/Data Collected;   Done:  69BNV7522  Lipid screening, Thyroid disorder screen    · (1) TSH; Status: In Progress - Specimen/Data Collected;   Done: 09EFW4173  Medicare annual wellness visit, initial    · Decreasing the stress in your life may help your condition improve ; Status:Complete;    Done: 37Ttu4765   · Drink plenty of fluids ; Status:Complete;   Done: 83ZHI0932   · Eat foods that are high in calcium ; Status:Complete;   Done: 29Nho6552   · Keep a diary of when and what you eat ; Status:Complete;   Done: 94Ept7873   · Regular aerobic exercise can help reduce stress ; Status:Complete;   Done: 72BSY8290   · The plan of care for falls is detailed in the plan and/or discussion section of today's note ;  Status:Complete;   Done: 59Hhe5033   · There are many exercise options for seniors ; Status:Complete;   Done: 45XEZ4906   · We encourage all of our patients to exercise regularly  30 minutes of exercise or physical  activity five or more days a week is recommended for children and adults ;  Status:Complete;   Done: 19Eik9656   · We encourage you to begin to make lifestyle changes to help control your blood  pressure  These may include losing weight, increasing your activity level, limiting salt in  your diet, decreasing alcohol intake, and eating a diet low in fat and rich in fruits  and vegetables ; Status:Complete;   Done: 66ITE3913   · We recommend that you follow the "Mediterranean diet "; Status:Complete;   Done:  26RCY9181   · We recommend you modify your diet to achieve and maintain a healthy weight  Being  underweight may increase your risk of developing health problems from vitamin and  mineral deficiencies  We recommend a balanced diet rich in fruits and vegetables  You  may also consider increasing your calorie intake by eating more frequently or adding  nuts, avocados, and low-fat cheese or milk to your meals  Please let us know  if you would like to learn more about your nutrition and calorie needs, and additional  options to help you achieve your weight goals ; Status:Complete;   Done: 73KRA7525   · Call [phone] if: You have any warning signs of skin cancer ; Status:Complete;    Done: 10SGI1781   · Call 911 if: You experience a new kind of chest pain (angina) or pressure ;  Status:Complete;   Done: 39SGG6676  Screening for colon cancer    · (1) OCCULT BLOOD, FECAL IMMUNOCHEMICAL TEST; Status:Active; Requested  for:13Xkw5536;   Screening for endocrine disorder, Screening, anemia, deficiency, iron    · (1) COMPREHENSIVE METABOLIC PANEL; Status: In Progress - Specimen/Data  Collected;   Done: 23HBR5621  Screening for genitourinary condition    · *VB - Urinary Incontinence Screen (Dx Z13 89 Screen for UI); Status:Complete;   Done:  68SFS8563 01:29PM   · *VB - Urinary Incontinence Screen (Dx Z13 89 Screen for UI) ; every 1 year; Last  61NHF5790; Next 40PGM6254; Status:Active  Screening for neurological condition    · *VB - Fall Risk Assessment  (Dx Z13 89 Screen for Neurologic Disorder);  Status:Complete;   Done: 46PUF0605 12:00AM   · *VB - Fall Risk Assessment  (Dx Z13 89 Screen for Neurologic Disorder) ; every 1 year; Last 08Sjt6157; Next 59BFC9550; Status:Active  Screening for prostate cancer, Thyroid disorder screen    · (1) PSA FREE & TOTAL; Status: In Progress - Specimen/Data Collected;   Done:  94Uvs3059   · (LC) Hemoglobin A1c; Status: In Progress - Specimen/Data Collected;   Done:  98FEB8196  Screening, anemia, deficiency, iron    · (1) CBC/PLT/DIFF; Status: In Progress - Specimen/Data Collected;   Done: 24Ylj6592   · (1) CBC/PLT/DIFF; Status: In Progress - Specimen/Data Collected;   Done: 12ZSB4779  Unlinked    · Medicare Annual Wellness Visit ; every 1 year; Last 80QZM7156; Next 70YTB9126;  Status:Active   · Medicare Annual Wellness Visit; Status:Complete;   Done: 47MCY0282    Discussion/Summary    1) diabetes type 2 uncontrolled, blood work done today, will call with results  2) fluids  3) increase activity as tolerated  4) send in stool cards instead of colonoscopy to check for hidden blood in stool   5) will need influenza immunization  Impression: Initial Annual Wellness Visit  Cardiovascular screening and counseling: due for a lipid panel  Diabetes screening and counseling: screening is current and due for blood glucose  Colorectal cancer screening and counseling: the patient declines screening and due for fecal occult blood testing  Prostate cancer screening and counseling: due for PSA  Osteoporosis screening and counseling: the patient declines screening     Abdominal aortic aneurysm screening and counseling: screening not indicated  Glaucoma screening and counseling: screening is current  HIV screening and counseling: screening not indicated  Immunizations: influenza vaccination is recommended annually, the lifetime pneumococcal vaccine has been completed, hepatitis B vaccination series is not indicated at this time due to the patient's low risk of alpa the disease, the patient declines the Td vaccine and the patient declines the Tdap vaccine  Advance Directive Planning: complete and up to date, he was encouraged to follow-up with me to discuss his questions and/or decisions  Chief Complaint  PT IS HERE FOR HIS INITIAL AWV  History of Present Illness  Patient is here for his first awv  She denies complaints today  His last INR was 4 0  He is holding Coumadin and has not had any bruising or bleeding  He denies any changes to his medications  He complains of some depressive symptoms and anxiety however he states he does not want to add any additional medications  He has been going to the eye doctor every 1-2 weeks for injections in his eyes  The patient is being seen for the initial annual wellness visit  Medicare Screening and Risk Factors   Hospitalizations: no previous hospitalizations  Once per lifetime medicare screening tests: ECG has not been done, AAA screening US has not yet been done and NO HX OF AAA SCREENING  Medicare Screening Tests Risk Questions   Abdominal aortic aneurysm risk assessment: over 72years of age  Osteoporosis risk assessment:  and over 48years of age  HIV risk assessment: none indicated  Drug and Alcohol Use: The patient has never smoked cigarettes  The patient reports never drinking alcohol  He has previously used illicit drugs  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 1 ) Little interest or pleasure in doing things? Several days  2 ) Feeling down, depressed or hopeless? Several days  3 ) Trouble falling asleep or sleeping too much? Several days  4 ) Feeling tired or having little energy? Several days  5 ) Poor appetite or overeating? Not at all    6 ) Feeling bad about yourself, or that you are a failure, or have let yourself or your family down? Several days  7 ) Trouble concentrating on things, such as reading a newspaper or watching television? Not at all    8 ) Moving or speaking so slowly that other people could have noticed, or the opposite, moving or speaking faster than usual? Not at all  TOTAL SCORE: 6    How difficult have these problems made it for you to do your work, take care of things at home, or get along with people? Not at all  Anxiety screening HX OF ANXIETY  Depression screening  mild to moderate symptoms  He denies feeling down, depressed, or hopeless over the past two weeks  He reports feeling little interest or pleasure in doing things over the past two weeks  Cognitive impairment screening: denies difficulty learning/retaining new information, denies difficulty handling complex tasks, denies difficulty with reasoning, denies difficulty with spatial ability and orientation, denies difficulty with language and difficulty with behavior  Functional Ability/Level of Safety: Hearing is normal bilaterally, normal in the right ear, normal in the left ear and a hearing aid is not used   The patient is currently able to drive with limitations, but able to do activities of daily living without limitations, able to do instrumental activities of daily living without limitations and able to participate in social activities without limitations  Activities of daily living details: YES  DIFFICULTY WITH NIGHT DRIVING, but does not need help using the phone, no transportation help needed, does not need help shopping, no meal preparation help needed, does not need help doing housework, does not need help doing laundry, does not need help managing medications and does not need help managing money  Fall risk factors: The patient fell NO times in the past 12 months  Home safety risk factors:  uneven floors, but no unfamiliar surroundings, no loose rugs, no poor household lighting, no household clutter and grab bars in the bathroom  Advance Directives: Advance directives: living will, durable power of  for health care directives, advance directives and daughter, making health care decisions for him if he cannot,  end of life decisions were reviewed with the patient and I agree with the patient's decisions  Co-Managers and Medical Equipment/Suppliers: See Patient Care Team   Falls Risk: The patient fell NO FALLS times in the past 12 months  The patient is currently asymptomatic Symptoms Include:  Associated symptoms:  No associated symptoms are reported  The patient currently has no urinary incontinence symptoms  08/23/2016      Patient Care Team    Care Team Member Role Specialty Office Number   Kajal Wheatley DO  Family Medicine [phone]     Review of Systems    Constitutional: negative  Head and Face: negative  Eyes: negative  ENT: negative  Cardiovascular: negative  Respiratory: negative  Gastrointestinal: negative  Genitourinary: negative  Musculoskeletal: negative  Integumentary and Breasts: negative  Neurological: negative  Psychiatric: irritability, anxiety and depression, but as noted in HPI, no insomnia and not suicidal    Endocrine: negative  Hematologic and Lymphatic: negative  Active Problems    1  Acute CHF (congestive heart failure) (428 0) (I50 9)   2  Acute renal failure (584 9) (N17 9)   3  Amputation of leg (V49 70) (Z89 619)   4  Anemia due to acute blood loss (285 1) (D62)   5  Benign essential hypertension (401 1) (I10)   6  Cervicalgia (723 1) (M54 2)   7  Chronic Non-pressure Ulcer Of The Foot (707 1)   8  Diabetes mellitus with neurological manifestation (250 60) (E11 49)   9  Diabetes mellitus with ophthalmic manifestations (250 50) (E11 39)   10  Diabetic retinopathy, nonproliferative, moderate (250 50,362 05) (E11 339)   11  Diastolic congestive heart failure (428 30,428 0) (I50 30)   12  Duodenal ulcer (532 90) (K26 9)   13  Edema (782 3) (R60 9)   14  Encounter for screening colonoscopy (V76 51) (Z12 11)   15  Encounter for screening for osteoporosis (V82 81) (Z13 820)   16  Gout (274 9) (M10 9)   17  Hyperlipidemia (272 4) (E78 5)   18  Lipid screening (V77 91) (Z13 220)   19  Need for pneumococcal vaccination (V03 82) (Z23)   20  Obesity (278 00) (E66 9)   21  Peripheral arterial disease (443 9) (I73 9)   22  Personal history of pulmonary embolism (V12 55) (Z86 711)   23  History of Pulmonary Embolism (V12 51)   24  Screening for endocrine disorder (V77 99) (Z13 29)   25  Screening for prostate cancer (V76 44) (Z12 5)   26  Screening, anemia, deficiency, iron (V78 0) (Z13 0)   27  Stasis leg ulcer (454 0) (I83 009)   28  Thyroid disorder screen (V77 0) (Z13 29)   29  Uncontrolled diabetes mellitus with complications (449 41) (F28 3,M78 18)   30  Wound, surgical, nonhealing (998 83) (T81 89XA)    Past Medical History    1  History of Cellulitis Of The Left Ankle (682 6)   2  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 (040 0)   3  History of acute bronchitis (V12 69) (Z87 09)   4  History of acute renal failure (V13 09) (Z87 448)   5  History of angina pectoris (V12 59) (Z86 15)   6   History of Osteomyelitis Of The Metatarsal Bones (196 00)   7  History of Pulmonary Embolism (V12 51)    The active problems and past medical history were reviewed and updated today  Surgical History    1  History of Amputation Of Leg Below Knee   2  History of PTA Of Aorta   3  History of Radiologic Supervision: Anisa Filter Placement In IVC    The surgical history was reviewed and updated today  Family History  Mother    1  Family history of Breast Cancer (V16 3)  Father    2  Family history of Congestive Heart Failure   3  Family history of Thyroid Cancer  Sister    4  Family history of Hodgkin Disease    The family history was reviewed and updated today  Social History    · Never a smoker   · Occupation:  The social history was reviewed and updated today  The social history was reviewed and is unchanged  Current Meds   1  Allopurinol 300 MG Oral Tablet; TAKE 1 TABLET DAILY AS DIRECTED; Therapy: 87OSA5668 to (Evaluate:30Mar2016)  Requested for: 88Ydw3178; Last   Rx:97Fay7691 Ordered   2  AmLODIPine Besylate 5 MG Oral Tablet; Take 1 tablet daily as directed; Therapy: 88PWS4114 to (Shanda August)  Requested for: 48QAQ0093; Last   Rx:23Nov2014 Ordered   3  BD Insulin Syringe U-100 1 ML Miscellaneous; 8units of novolog tid with meals; Therapy: 67QOF1705 to (Last Rx:14Nov2015)  Requested for: 65DHX8226 Ordered   4  CloNIDine HCl - 0 2 MG Oral Tablet; TAKE 1 TABLET TWICE DAILY; Therapy: 16PGZ2628 to (Blaine Bonilla)  Requested for: 74GFP7223; Last   Rx:30Dec2015 Ordered   5  Colchicine 0 6 MG Oral Tablet; TAKE 1 TABLET TWICE DAILY WITH FOOD AS NEEDED; Therapy: 02BNQ5906 to (Evaluate:10Jan2016)  Requested for: 52Rip4585; Last   Rx:58Mnt9364 Ordered   6  Furosemide 40 MG Oral Tablet; PT IS TAKING 120 MG DAILY; Therapy: 03TYQ7958 to (Evaluate:17Dec2016)  Requested for: 49Wta2432 Recorded   7  HydrALAZINE HCl - 50 MG Oral Tablet; PT IS TAKING 75 MG 3 X DAILY; Therapy: 17YNG6098 to  Requested for: 38Zdf5246 Recorded   8  Hydrocodone-Acetaminophen 5-325 MG Oral Tablet; TAKE 1 TABLET 3 times daily PRN   pain; Therapy: 48KMZ3110 to (Evaluate:07Jan2016); Last Rx:52Dnk4867 Ordered   9  Lantus SoloStar 100 UNIT/ML Subcutaneous Solution Pen-injector; inject 30 units at   bedtime; Therapy: 69ABR5274 to ([facility])  Requested for: 68Ssm4326; Last   Rx:37Ajo3547; Status: ACTIVE - Transmit to [facility] Verification Ordered   10  Multivitamins Oral Capsule; Therapy: (Recorded:80Lpm3743) to Recorded   11  NovoFine 32G X 6 MM Miscellaneous; use as directed; Therapy: 40PIS4942 to (Evaluate:23Sep2016)  Requested for: 73Yhx5348; Last    Rx:18Kmw4464; Status: ACTIVE - Transmit to [facility] Verification Ordered   12  NovoLOG FlexPen 100 UNIT/ML Subcutaneous Solution Pen-injector; INJECT 8 UNITS    SUBCUTANEOUSLY THREE TIMES DAILY WITH MEALS AS DIRECTED (PEN EXPIRES    28 DAYS AFTER FIRST USE); Therapy: [facility]Jwa6928 [facility]  Requested for: 97Emd8878; Last    Rx:53Bjp4538; Status: ACTIVE - Transmit to [facility] Verification Ordered   13  Pantoprazole Sodium 40 MG Oral Tablet Delayed Release; TAKE 30 MINUTES BEFORE    A MEAL TWICE A DAY FOR A MONTH THEN DECREASED TO ONE DOSE DAILY; Therapy: 19Cnz9939 to (Last Rx:02Jdw5273) Ordered   14  Simvastatin 40 MG Oral Tablet; take 1 tablet by mouth every evening  Requested for:    70ZTV2759; Last Rx:77Jhg8149 Ordered   15  TraMADol HCl - 50 MG Oral Tablet; TAKE 1 TABLET 3 TIMES DAILY AS NEEDED; Therapy: 07ZMB2744 to (Evaluate:04Feb2016); Last Rx:25Jan2016 Ordered   16  Warfarin Sodium 2 5 MG Oral Tablet; MONDAY, THURSDAY; Therapy: 79Jup9694 to Recorded   17  Warfarin Sodium 4 MG Oral Tablet; Take as directed; Therapy: 94KOO4837 to [phone])  Requested for: 13ASG0251; Last    Rx:04Mar2015 Ordered   18  Warfarin Sodium 5 MG Oral Tablet; TAKE 1 TABLET (5MG) EVERY DAY EXCEPT MONDAY    TAKE 1/2 TABLET (2 5MG);     Therapy: 90Mrt7534 to (Evaluate:16Cva8847)  Requested for: 85Qns2201; Last    Rx:27Gas8025 Ordered    The medication list was reviewed and updated today  Allergies    1   No Known Drug Allergies    Immunizations  Influenza --- Geovanna Plymouth: [id number] Drop: Nov 2015   PCV --- Geovanna [place]: 23-Dec-2015   PPSV --- Series1: 20-Dec-2013     Vitals  Signs   Recorded: 54MWD3100 40:90XF   Systolic: 079  Diastolic: 70  Recorded: 22WSU5334 14:49RM   Systolic: 472  Diastolic: 72  Heart Rate: 80  Temperature: 97 4 F  O2 Saturation: 98  Height: 5 ft 9 in  Weight: 280 lb   BMI Calculated: 41 35  BSA Calculated: 2 38    Results/Data  *VB - Urinary Incontinence Screen (Dx Z13 89 Screen for UI) 21Sep2016 01:29PM Aby Almonte     Test Name Result Flag Reference   Urinary Incontinence Assessment 21Sep2016       AUA Symptom Score 21Sep2016 01:18PM UserCharley     Test Name Result Flag Reference   AUA Symptom Score (for prostate disease) 0     Incomplete emptying: Not at all (0)  Frequency: Not at all (0)  Intermittency: Not at all (0)  Urgency: Not at all (0)  Weak-stream: Not at all (0)  Straining: Not at all (0)  Nocturia: Not at all (0)   AUA Symptom Score (for prostate disease) - Score Category N/A     AUA Symptom Score (for prostate disease) - Quality of Life Due to Urinary Symptoms No Feelings       Almer Mask Eye Exam 93OIC4447 12:00AM      Test Name Result Flag Reference   Retinopathy Screening Retinopathy on exam         Signatures   Electronically signed by : Delmi Estes DO; Sep 21 2016 11:26PM EST                       (Author)

## 2018-01-13 NOTE — RESULT NOTES
Verified Results  (1) HEMOGLOBIN A1C [id number] 10:31PM Kalyani      Test Name Result Flag Reference   HEMOGLOBIN A1C 10 0 % H 4 2-6 3   EST  AVG   GLUCOSE 240 mg/dl

## 2018-01-13 NOTE — RESULT NOTES
Verified Results  Coumadin Flow Sheet [id number] 12:18PM Abel Castaneda     Test Name Result Flag Reference   Recheck INR [id number]     Current Dose      7 5mg m, 5mg other days   New Dose      7 5mg m and 5mg other days

## 2018-01-13 NOTE — RESULT NOTES
Verified Results  Coumadin Flow Sheet [id number] 12:47PM Lissette Part     Test Name Result Flag Reference   Recheck INR [id number]     New Dose 5mg daily

## 2018-01-13 NOTE — RESULT NOTES
Verified Results  (1) HEMOGLOBIN A1C [id number] 03:03PM Theone Pump     Test Name Result Flag Reference   HEMOGLOBIN A1C 8 7        Summary / No summary entered :      No summary entered   Documents attached :      [address] Work - Theone Pump; Enc: [id number] - Chart Update - Aubrey Long - (Family Medicine) (Additional Information Document)

## 2018-01-13 NOTE — RESULT NOTES
Verified Results  Coumadin Flow Sheet [id number] 02:57PM Wanda Woodard     Test Name Result Flag Reference   INR 1 9     Recheck INR [id number]     Current Dose      2 5mg m and 5mg other days   New Dose 5mg daily

## 2018-01-13 NOTE — RESULT NOTES
Verified Results  Coumadin Flow Sheet [id number] 01:16PM Alfonso Vasquez     Test Name Result Flag Reference   New Dose 5mg daily     Recheck INR [id number]

## 2018-01-14 VITALS
OXYGEN SATURATION: 97 % | HEIGHT: 69 IN | HEART RATE: 106 BPM | DIASTOLIC BLOOD PRESSURE: 80 MMHG | SYSTOLIC BLOOD PRESSURE: 140 MMHG | TEMPERATURE: 97 F | BODY MASS INDEX: 40.58 KG/M2 | WEIGHT: 274 LBS

## 2018-01-14 NOTE — RESULT NOTES
Verified Results  Coumadin Flow Sheet [id number] 10:59AM Kate Tejada     Test Name Result Flag Reference   Recheck INR [id number]     New Dose 5mg daily

## 2018-01-14 NOTE — RESULT NOTES
Verified Results  Coumadin Flow Sheet [id number] 03:40PM Kajal Wheatley     Test Name Result Flag Reference   Recheck INR [id number]     New Dose      7 5mg m and 5mg other days

## 2018-01-15 ENCOUNTER — GENERIC CONVERSION - ENCOUNTER (OUTPATIENT)
Dept: OTHER | Facility: OTHER | Age: 68
End: 2018-01-15

## 2018-01-15 NOTE — RESULT NOTES
Verified Results  Coumadin Flow Sheet [id number] 01:08PM Greg Barnettch     Test Name Result Flag Reference   Recheck INR [id number]     New Dose      7 5mg m and 5mg all other days,

## 2018-01-15 NOTE — RESULT NOTES
Verified Results  Coumadin Flow Sheet [id number] 12:42PM Shyrl Timothy     Test Name Result Flag Reference   Recheck INR [id number]     New Dose 5mg daily

## 2018-01-15 NOTE — RESULT NOTES
Verified Results  Coumadin Flow Sheet [id number] 03:45PM Oral Crenshaw     Test Name Result Flag Reference   Recheck INR [id number]     New Dose 5mg daily

## 2018-01-15 NOTE — RESULT NOTES
Verified Results  Coumadin Flow Sheet [id number] 09:57AM Mona Reagan     Test Name Result Flag Reference   Recheck INR [id number]     New Dose      7 5mg m and 5mg all other days

## 2018-01-15 NOTE — RESULT NOTES
Verified Results  (1) CBC/PLT/DIFF [id number] 09:00AM Anna Estrada     Test Name Result Flag Reference   WBC COUNT 7 87 Thousand/uL  4 31-10 16   RBC COUNT 4 49 Million/uL  3 88-5 62   HEMOGLOBIN 13 2 g/dL  12 0-17 0   HEMATOCRIT 41 0 %  36 5-49 3   MCV 91 fL  82-98   MCH 29 4 pg  26 8-34 3   MCHC 32 2 g/dL  31 4-37 4   RDW 14 8 %  11 6-15 1   MPV 12 7 fL  8 9-12 7   PLATELET COUNT 698 Thousands/uL  149-390   NEUTROPHILS RELATIVE PERCENT 67 %  43-75   LYMPHOCYTES RELATIVE PERCENT 22 %  14-44   MONOCYTES RELATIVE PERCENT 8 %  4-12   EOSINOPHILS RELATIVE PERCENT 2 %  0-6   BASOPHILS RELATIVE PERCENT 1 %  0-1   NEUTROPHILS ABSOLUTE COUNT 5 26 Thousands/?L  1 85-7 62   LYMPHOCYTES ABSOLUTE COUNT 1 73 Thousands/?L  0 60-4 47   MONOCYTES ABSOLUTE COUNT 0 64 Thousand/?L  0 17-1 22   EOSINOPHILS ABSOLUTE COUNT 0 19 Thousand/?L  0 00-0 61   BASOPHILS ABSOLUTE COUNT 0 05 Thousands/?L  0 00-0 10     (1) COMPREHENSIVE METABOLIC PANEL [id number] 39:12EU Anna Estrada     Test Name Result Flag Reference   GLUCOSE,RANDM 334 mg/dL H    If the patient is fasting, the ADA then defines impaired fasting glucose as > 100 mg/dL and diabetes as > or equal to 123 mg/dL     SODIUM 142 mmol/L  136-145   POTASSIUM 3 8 mmol/L  3 5-5 3   CHLORIDE 102 mmol/L  100-108   CARBON DIOXIDE 26 mmol/L  21-32   ANION GAP (CALC) 14 mmol/L H 4-13   BLOOD UREA NITROGEN 57 mg/dL H 5-25   CREATININE 1 69 mg/dL H 0 60-1 30   Standardized to IDMS reference method   CALCIUM 9 2 mg/dL  8 3-10 1   BILI, TOTAL 0 30 mg/dL  0 20-1 00   ALK PHOSPHATAS 164 U/L H    ALT (SGPT) 30 U/L  12-78   AST(SGOT) 18 U/L  5-45   ALBUMIN 3 7 g/dL  3 5-5 0   TOTAL PROTEIN 7 3 g/dL  6 4-8 2   eGFR Non-African American 40 7 ml/min/1 73sq [facility] Disease Education Program recommendations are as follows:  GFR calculation is accurate only with a steady state creatinine  Chronic Kidney disease less than 60 ml/min/1 73 sq  meters  Kidney failure less than 15 ml/min/1 73 sq  meters  (1) LIPID PANEL, FASTING [id number] 09:00AM Elfrieda Stamford     Test Name Result Flag Reference   CHOLESTEROL 194 mg/dL     HDL,DIRECT 43 mg/dL  40-60   Specimen collection should occur prior to Metamizole administration due to the potential for falsely depressed results  LDL CHOLESTEROL CALCULATED 117 mg/dL H 0-100   Triglyceride:         Normal              <150 mg/dl       Borderline High    150-199 mg/dl       High               200-499 mg/dl       Very High          >499 mg/dl  Cholesterol:         Desirable        <200 mg/dl      Borderline High  200-239 mg/dl      High             >239 mg/dl  HDL Cholesterol:        High    >59 mg/dL      Low     <41 mg/dL  LDL CALCULATED:    This screening LDL is a calculated result  It does not have the accuracy of the Direct Measured LDL in the monitoring of patients with hyperlipidemia and/or statin therapy  Direct Measure LDL (WRP462) must be ordered separately in these patients  TRIGLYCERIDES 170 mg/dL H <=150   Specimen collection should occur prior to N-Acetylcysteine or Metamizole administration due to the potential for falsely depressed results  (1) TSH [id number] 09:00AM Elfrieda Stamford     Test Name Result Flag Reference   TSH 6 264 uIU/mL H 0 358-3 740   Patients undergoing fluorescein dye angiography may retain small amounts of fluorescein in the body for 48-72 hours post procedure  Samples containing fluorescein can produce falsely depressed TSH values  If the patient had this procedure,a specimen should be resubmitted post fluorescein clearance  (1) HEMOGLOBIN A1C [id number] 09:00AM Elfrieda Stamford     Test Name Result Flag Reference   HEMOGLOBIN A1C 10 4 % H 4 2-6 3   EST  AVG   GLUCOSE 252 mg/dl       (1) MICROALBUMIN CREATININE RATIO, RANDOM URINE [id number] 12:00AM Elfrieda Stamford     Test Name Result Flag Reference   MICROALBUMIN/ CREAT R 1634 mg/g creatinine H 0-30   MICROALBUMIN,URINE 526 0 mg/L H 0 0-20 0   CREATININE URINE 32 2 mg/dL

## 2018-01-16 NOTE — RESULT NOTES
Verified Results  Coumadin Flow Sheet [id number] 01:27PM Yaniv Caal     Test Name Result Flag Reference   INR 2 7     Recheck INR [id number]     Current Dose 5mg daily     New Dose 5mg daily

## 2018-01-16 NOTE — RESULT NOTES
Verified Results  Coumadin Flow Sheet [id number] 11:00AM Adamthea Batch     Test Name Result Flag Reference   INR 4 5     Recheck INR [id number]     Current Dose 5mg daily     New Dose      hold 3 days and restart 2 5mg/5mg alternating

## 2018-01-16 NOTE — CONSULTS
Chief Complaint  PT IS HERE FOR HIS PRE-OP EVALUATION  PT WILL BE HAVING BARIATRIC SURGERY AT [address] [place] [address]  PT HAD PRE-ADMISSION TESTING COMPLETED  FLU VACCINE WAS DONE ON OCTOBER 10, 2017  History of Present Illness  Pre-Op Visit (Brief): The patient is being seen for a preoperative visit and BARIATRIC SURGERY AT [address] [place] [address]  The procedure is a(n) bariatric surgery with Dr Anahy Ramírez  The indication for surgery is obesity, type 2 diabetes- uncontrolled,  Surgical Risk Assessment:   Prior Anesthesia: He had prior anesthesia and no prior adverse reaction to general anesthesia  Pertinent Past Medical History: diabetes type 2  Exercise Capacity: able to walk four blocks without symptoms and able to walk two flights of stairs without symptoms  Lifestyle Factors: denies alcohol use, denies tobacco use and denies illegal drug use  Symptoms: no symptoms and dyspnea  STOP questionnaire score is 1  Other RENA risk factors include high BMI, male gender, large neck circumference and age over 48  Predicted risk of RENA: Moderate and obstructive sleep apnea  Pertinent Family History: no pertinent family history  Living Situation: home is secure and supportive and no post-op concerns with his living situation  HPI: pt here for pre-op physical for bariatric surgery  He has uncontrolled diabetes type 2 with history of bka left lower leg  He has been through the evaluations and has been cleared by cardiology and pulmonology  He denies chest pain but has some intermittent shortness of breath  He has been trying to lose weight and has lost 17 pounds since august  He was never a smoker and quit alcohol 7 years ago  His bms have been more constipated since being on prednisone  He has now stopped  Gout has been stable  no recent flares   He has been taking lantus 30u at bedtime and 8 units of novolog with each meal  his most recent hba1c on 11/1 on 8 7        Review of Systems    Constitutional: recent 17 lb weight loss, but as noted in HPI and no fever  Eyes: No complaints of eye pain, no red eyes, no discharge from eyes, no itchy eyes  ENT: no complaints of earache, no hearing loss, no nosebleeds, no nasal discharge, no sore throat, no hoarseness  Cardiovascular: lower extremity edema, but as noted in HPI  Respiratory: No complaints of shortness of breath, no wheezing, no cough, no SOB on exertion, no orthopnea or PND  Gastrointestinal: No complaints of abdominal pain, no constipation, no nausea or vomiting, no diarrhea or bloody stools  Genitourinary: No complaints of dysuria, no incontinence, no hesitancy, no nocturia, no genital lesion, no testicular pain  Musculoskeletal: history bka left lower leg, but as noted in HPI  Integumentary: No complaints of skin rash or skin lesions, no itching, no skin wound, no dry skin  Neurological: No compliants of headache, no confusion, no convulsions, no numbness or tingling, no dizziness or fainting, no limb weakness, no difficulty walking  Psychiatric: Is not suicidal, no sleep disturbances, no anxiety or depression, no change in personality, no emotional problems  Endocrine: No complaints of proptosis, no hot flashes, no muscle weakness, no erectile dysfunction, no deepening of the voice, no feelings of weakness  Hematologic/Lymphatic: No complaints of swollen glands, no swollen glands in the neck, does not bleed easily, no easy bruising  Active Problems    1  Allergic dermatitis (692 9) (L23 9)   2  Amputation of leg (V49 70) (Z89 619)   3  Benign essential hypertension (401 1) (I10)   4  BMI 40 0-44 9, adult (V85 41) (Z68 41)   5  Controlled diabetes mellitus with ophthalmic complication (937 40) (C84 34)   6  Diabetes mellitus with neurological manifestation (250 60) (E11 49)   7  Diabetes type 2, uncontrolled (250 02) (E11 65)   8   Diabetic retinopathy, nonproliferative, moderate (250 50,362 05) (Y36 2211)   9  Diastolic congestive heart failure (428 30,428 0) (I50 30)   10  Edema (782 3) (R60 9)   11  Gout (274 9) (M10 9)   12  History of acute congestive heart failure (V12 59) (Z86 79)   13  History of acute renal failure (V13 09) (Z87 448)   14  History of below knee amputation, left (V49 75) (Z89 512)   15  Hyperlipidemia (272 4) (E78 5)   16  Long term current use of anticoagulant therapy (V58 61) (Z79 01)   17  Peripheral arterial disease (443 9) (I73 9)   18  Personal history of pulmonary embolism (V12 55) (Z86 711)   19  History of Pulmonary Embolism (V12 51)   20  Uncontrolled diabetes mellitus with complications (956 35) (J05 7,T99 19)    Past Medical History    · History of Anemia due to acute blood loss (285 1) (D62)   · History of Cellulitis Of The Left Ankle (682 6)   · History of Chronic Non-pressure Ulcer Of The Foot (707 1)   · 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 (040 0)   · History of acute bronchitis (V12 69) (Z87 09)   · History of acute congestive heart failure (V12 59) (Z86 79)   · History of acute renal failure (V13 09) (Z87 448)   · History of acute renal failure (V13 09) (Z87 448)   · History of angina pectoris (V12 59) (Z86 79)   · History of duodenal ulcer (V12 79) (Z87 19)   · History of neck pain (V13 59) (Z87 39)   · History of Osteomyelitis Of The Metatarsal Bones (730 27)   · History of Pulmonary Embolism (V12 51)   · History of Stasis leg ulcer (454 0) (I83 009)    The active problems and past medical history were reviewed and updated today  Surgical History    · History of Amputation Of Leg Below Knee   · History of PTA Of Aorta   · History of Radiologic Supervision: [place] Filter Placement In IVC    The surgical history was reviewed and updated today         Family History    · Family history of Breast Cancer (V16 3)    · Family history of Congestive Heart Failure   · Family history of Thyroid Cancer    · Family history of Hodgkin Disease    The family history was reviewed and updated today  Social History    · Never a smoker   · 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  The social history was reviewed and updated today  The social history was reviewed and is unchanged  Current Meds   1  Allopurinol 300 MG Oral Tablet; TAKE 1 TABLET DAILY AS DIRECTED; Therapy: 77GVX4950 to (Evaluate:30Mar2016)  Requested for: 27Qhd9982; Last   Rx:74Yle9974 Ordered   2  AmLODIPine Besylate 5 MG Oral Tablet; Take 1 tablet daily as directed; Therapy: 69QTH8970 to ([facility])  Requested for: 28CFB0061; Last   Rx:23Nov2014 Ordered   3  BD Insulin Syringe U-100 1 ML Miscellaneous; 8units of novolog tid with meals; Therapy: 47KBT1921 to (Last Rx:14Nov2015)  Requested for: 97LOZ8398 Ordered   4  CloNIDine HCl - 0 2 MG Oral Tablet; Take 1 tablet twice daily; Therapy: 31UPJ8173 to (Evaluate:14Wyl1464)  Requested for: 21Mar2017; Last   Rx:21Mar2017 Ordered   5  Furosemide 80 MG Oral Tablet; Therapy: 46Fmj0582 to Recorded   6  HydrALAZINE HCl - 50 MG Oral Tablet; PT IS TAKING 75 MG 3 X DAILY; Therapy: 36JGN6352 to  Requested for: 07Umv1353 Recorded   7  Lantus SoloStar 100 UNIT/ML Subcutaneous Solution Pen-injector; inject 30 units at   bedtime; Therapy: 79KTA4575 to ([facility])  Requested for: 86PVP7738; Last   Rx:03Oct2017 Ordered   8  Meloxicam 7 5 MG Oral Tablet; TAKE ONE TABLET BY MOUTH ONCE DAILY; Therapy: 04YCA6330 to (Evaluate:30Oct2017)  Requested for: 90Lba5543; Last   Rx:24Zvr7019 Ordered   9  Multivitamins Oral Capsule; Therapy: (Recorded:53Hov9450) to Recorded   10  NovoFine 32G X 6 MM Miscellaneous; use as directed; Therapy: 50OPL0106 to (Evaluate:51Iwc1781)  Requested for: 69Ntk0855; Last    Rx:37Sqp8561; Status: ACTIVE - Transmit to [facility] Ordered   11   NovoLOG FlexPen 100 UNIT/ML Subcutaneous Solution Pen-injector; USE 8 UNITS    SUBCUTANEOUSLY THREE TIMES A DAY WITH MEALS AS DIRECTED- DISCARD PEN    28 DAYS AFTER 1ST USE;    Therapy: 90Jcg8563 to (Joey Hannah)  Requested for: 39NRU7118; Last    Rx:03Oct2017 Ordered   12  Simvastatin 40 MG Oral Tablet; TAKE 1 TABLET EVERY EVENING; Therapy: 73KTP0915 to (Evaluate:93Dgb2542)  Requested for: 21Mar2017; Last    Rx:21Mar2017 Ordered   13  Triamcinolone Acetonide 0 1 % External Cream; APPLY A THIN LAYER TO AFFECTED    AREA(S) TWICE DAILY; Therapy: 64UNK2402 to ([id number])  Requested for: 28TFF9383; Last    Rx:16Oct2017 Ordered   14  Warfarin Sodium 1 MG Oral Tablet; 1 tab every monday     (continue 5mg daily); Therapy: 60QYT2823 to (Last Rx:84Xfj4640)  Requested for: 83LHS7037 Ordered   15  Warfarin Sodium 5 MG Oral Tablet; TAKE 1 TABLET (5MG) EVERY DAY EXCEPT TAKE 1    1/2 TABLET (7 5MG) ON MONDAY; Therapy: 23DMF1633 to (Evaluate:00Xna3516)  Requested for: 35Ldp9583; Last    Rx:09Ddd8359 Ordered    The medication list was reviewed and updated today  Allergies    1  No Known Drug Allergies    Vitals  Signs    Systolic: 637  Diastolic: 80   Temperature: 97 F  Heart Rate: 553  Systolic: 059  Diastolic: 86  Height: 5 ft 9 in  Weight: 274 lb   BMI Calculated: 40 46  BSA Calculated: 2 36  O2 Saturation: 97    Physical Exam    Constitutional   General appearance: Abnormal   obese  Head and Face   Head and face: Normal     Palpation of the face and sinuses: No sinus tenderness  Eyes   Conjunctiva and lids: No erythema, swelling or discharge  Pupils and irises: Equal, round, reactive to light  Ears, Nose, Mouth, and Throat   External inspection of ears and nose: Normal     Otoscopic examination: Tympanic membranes translucent with normal light reflex  Canals patent without erythema  Hearing: Normal     Nasal mucosa, septum, and turbinates: Normal without edema or erythema  Lips, teeth, and gums: Normal, good dentition  Oropharynx: Normal with no erythema, edema, exudate or lesions      Neck   Neck: Supple, symmetric, trachea midline, no masses  Thyroid: Normal, no thyromegaly  Pulmonary   Respiratory effort: No increased work of breathing or signs of respiratory distress  Auscultation of lungs: Clear to auscultation  Cardiovascular   Auscultation of heart: Normal rate and rhythm, normal S1 and S2, no murmurs  Examination of extremities for edema and/or varicosities: Abnormal   mild lower extremity edema right lower extremity  Abdomen   Abdomen: Non-tender, no masses  Liver and spleen: No hepatomegaly or splenomegaly  Results/Data  Diabetes Flow Sheet [id number] 11:33AM      Test Name Result Flag Reference   LDL CHOLESTEROL CALCULATED 109       (1) GLUCOSE,  FASTING 35SSP4475 11:31AM      Test Name Result Flag Reference   GLUCOSE FASTING 195 H      No acute ischemia  Rhythm and rate: ventricular rate is 56 beats per minute and sinus bradycardia  AK Interval: first degree heart block  (1) HEMOGLOBIN A1C 84ICP9240 03:03PM Oral Crenshaw     Test Name Result Flag Reference   HEMOGLOBIN A1C 8 7       Assessment    1  Pre-op examination (V72 84) (Z01 818)   2  Diabetes type 2, uncontrolled (250 02) (E11 65)   3  BMI 40 0-44 9, adult (V85 41) (Z68 41)   4  Chronic diastolic congestive heart failure (428 32,428 0) (I50 32)   5  Parathyroid dysfunction (252 9) (E21 5)   6  Never a smoker   7  Benign essential hypertension (401 1) (I10)   8  Hyperlipidemia (272 4) (E78 5)   9  Personal history of pulmonary embolism (V12 55) (Z86 711)   10  Vitamin D deficiency (268 9) (E55 9)   11  Chronic kidney disease (CKD), stage II (mild) (585 2) (N18 2)    Plan  Allergic dermatitis    · Triamcinolone Acetonide 0 1 % External Cream  Gout    · Meloxicam 7 5 MG Oral Tablet    Discussion/Summary  Surgical Clearance: He is at a LOW TO MODERATE risk from a cardiovascular standpoint at this time without any additional cardiac testing  Reevaluation needed, if he should present with symptoms prior to surgery/procedure  Attachments: nuclear study  Surgical clearance faxed to Dr Diop Neither   1) pt approved for surgery   2) call cardiology once you get surgical date for date to stop coumadin/bridge  3) stop meloxicam 1 week prior to surgery -   4) ok tylenol   5) up to date with immunizations - had influenza and pneumococcal immunizations  6) vitamin d deficiency: vitamin d3 5000iu daily   7) Diabetes type 2: hba1c 8 7  8) chronic kidney disease stage 2: gfr: 37 7 ,slight decrease , last 40 7  9) chronic congestive heart failure: ef 50-55% by recent echo 9/29/2017  10) nuclear stress test done by cardiology  11) hypercholesterolemia: controlled, continue simvastatin  12) hypertension: will need to continue to monitor   13) elevated PTH, recommend repeat after surgery and endocrine evaluation     The patient was counseled regarding diagnostic results, instructions for management, prognosis  Possible side effects of new medications were reviewed with the patient/guardian today  The treatment plan was reviewed with the patient/guardian  The patient/guardian understands and agrees with the treatment plan      End of Encounter Meds    1  AmLODIPine Besylate 5 MG Oral Tablet; Take 1 tablet daily as directed; Therapy: 48XPZ6713 to (Beny Daly)  Requested for: 94GMA2991; Last   Rx:23Nov2014 Ordered   2  CloNIDine HCl - 0 2 MG Oral Tablet; Take 1 tablet twice daily; Therapy: 47FDK7758 to (Evaluate:78Jpb4327)  Requested for: 21Mar2017; Last   Rx:21Mar2017 Ordered   3  HydrALAZINE HCl - 50 MG Oral Tablet; PT IS TAKING 75 MG 3 X DAILY; Therapy: 97AVD3148 to  Requested for: 70Dxe9505 Recorded    4  BD Insulin Syringe U-100 1 ML Miscellaneous; 8units of novolog tid with meals; Therapy: 78FND2585 to (Last Rx:14Nov2015)  Requested for: 86PJL6969 Ordered    5  Lantus SoloStar 100 UNIT/ML Subcutaneous Solution Pen-injector; inject 30 units at   bedtime; Therapy: 04SCS9892 to (Eual Slate)  Requested for: 24UGI3464; Last   Rx:03Oct2017 Ordered   6  NovoFine 32G X 6 MM Miscellaneous; use as directed; Therapy: 87FGD9758 to (Evaluate:54Dmv4732)  Requested for: 74Edq7352; Last   Rx:85Vzw0893; Status: [address] epi Cruz Ordered   7  NovoLOG FlexPen 100 UNIT/ML Subcutaneous Solution Pen-injector; USE 8 UNITS   SUBCUTANEOUSLY THREE TIMES A DAY WITH MEALS AS DIRECTED- DISCARD PEN   28 DAYS AFTER 1ST USE; Therapy: 78Vrc7380 to (Marizol Buck)  Requested for: 63UQN9113; Last   Rx:03Oct2017 Ordered    8  Warfarin Sodium 5 MG Oral Tablet; TAKE 1 TABLET (5MG) EVERY DAY EXCEPT TAKE 1   1/2 TABLET (7 5MG) ON MONDAY; Therapy: 93PGU5151 to (Evaluate:51Wqm4585)  Requested for: 93Zla1828; Last   Rx:02Iqj9105 Ordered    9  Allopurinol 300 MG Oral Tablet; TAKE 1 TABLET DAILY AS DIRECTED; Therapy: 28GFQ0914 to (Evaluate:30Mar2016)  Requested for: 14Zzr1623; Last   Rx:36Btz8214 Ordered    10  Multivitamins Oral Capsule; Therapy: (Recorded:74Ifn5488) to Recorded    11  Simvastatin 40 MG Oral Tablet; TAKE 1 TABLET EVERY EVENING; Therapy: 10THI9283 to (Evaluate:18Oro9327)  Requested for: 21Mar2017; Last    Rx:21Mar2017 Ordered    12  Warfarin Sodium 1 MG Oral Tablet; 1 tab every monday     (continue 5mg daily); Therapy: 90WNN6137 to (Last Rx:41Xgs1001)  Requested for: 16BZZ9554 Ordered    13  Furosemide 80 MG Oral Tablet;     Therapy: 37ZHJ9156 to Recorded    Signatures   Electronically signed by : Yamilka Oliveros DO; Nov 25 2017  1:31PM EST                       (Author)

## 2018-01-16 NOTE — RESULT NOTES
Verified Results  Coumadin Flow Sheet [id number] 08:51AM Devi Rodríguez     Test Name Result Flag Reference   Recheck INR 94WLQ2463     New Dose      7 5mg m and 5mg other days

## 2018-01-16 NOTE — RESULT NOTES
Verified Results  (1) HEMOGLOBIN A1C 80ETE0847 07:51AM Abelino Francis     Test Name Result Flag Reference   HEMOGLOBIN A1C 8 7        Summary / No summary entered :      No summary entered   Documents attached :      [address] Work - Abelino Francis; Enc: [id number] - Chart Update - Leigh Franklin - (Family Medicine) (Additional Information Document)

## 2018-01-17 NOTE — RESULT NOTES
Verified Results  Coumadin Flow Sheet [id number] 12:28PM Shyrl Timothy     Test Name Result Flag Reference   Recheck INR 47WLA0254     New Dose      5mg 11/2 tabs today then 5mg daily       Signatures   Electronically signed by : Kitty Caldera DO; May 25 2016 12:30PM EST                       (Author)

## 2018-01-18 NOTE — RESULT NOTES
Verified Results  Coumadin Flow Sheet [id number] 04:57PM Darshan Rossi     Test Name Result Flag Reference   Recheck INR 14BNH9437     New Dose 5MG DAILY

## 2018-01-23 ENCOUNTER — ANTICOAG VISIT (OUTPATIENT)
Dept: FAMILY MEDICINE CLINIC | Facility: CLINIC | Age: 68
End: 2018-01-23

## 2018-01-24 ENCOUNTER — TELEPHONE (OUTPATIENT)
Dept: FAMILY MEDICINE CLINIC | Facility: CLINIC | Age: 68
End: 2018-01-24

## 2018-01-24 DIAGNOSIS — G89.11 ACUTE PAIN DUE TO INJURY: Primary | ICD-10-CM

## 2018-01-24 RX ORDER — FUROSEMIDE 80 MG
1 TABLET ORAL DAILY
COMMUNITY
Start: 2017-02-23 | End: 2018-02-27

## 2018-01-24 RX ORDER — TRIAMCINOLONE ACETONIDE 1 MG/G
1 CREAM TOPICAL 2 TIMES DAILY
COMMUNITY
Start: 2017-10-16 | End: 2019-01-30 | Stop reason: ALTCHOICE

## 2018-01-24 RX ORDER — HYDROXYZINE HYDROCHLORIDE 25 MG/1
.5-1 TABLET, FILM COATED ORAL 3 TIMES DAILY PRN
COMMUNITY
Start: 2017-10-16

## 2018-01-24 RX ORDER — AMLODIPINE BESYLATE 5 MG/1
1 TABLET ORAL DAILY
COMMUNITY
Start: 2014-09-05 | End: 2018-02-07 | Stop reason: SDUPTHER

## 2018-01-24 RX ORDER — WARFARIN SODIUM 1 MG/1
1 TABLET ORAL DAILY
COMMUNITY
Start: 2016-12-07 | End: 2018-02-07

## 2018-01-24 RX ORDER — ALLOPURINOL 300 MG/1
1.5 TABLET ORAL DAILY
COMMUNITY
Start: 2015-12-31

## 2018-01-24 RX ORDER — HYDROCODONE BITARTRATE AND ACETAMINOPHEN 5; 325 MG/1; MG/1
1 TABLET ORAL EVERY 6 HOURS PRN
Qty: 120 TABLET | Refills: 0 | Status: SHIPPED | OUTPATIENT
Start: 2018-01-24 | End: 2019-01-30 | Stop reason: ALTCHOICE

## 2018-01-24 RX ORDER — SIMVASTATIN 40 MG
1 TABLET ORAL
COMMUNITY
Start: 2017-03-21 | End: 2018-02-08 | Stop reason: SDUPTHER

## 2018-01-24 RX ORDER — HYDRALAZINE HYDROCHLORIDE 50 MG/1
1.5 TABLET, FILM COATED ORAL 3 TIMES DAILY
COMMUNITY
Start: 2014-09-05

## 2018-01-24 RX ORDER — CLONIDINE HYDROCHLORIDE 0.2 MG/1
1 TABLET ORAL 2 TIMES DAILY
COMMUNITY
Start: 2014-09-05 | End: 2018-04-21 | Stop reason: SDUPTHER

## 2018-01-24 RX ORDER — WARFARIN SODIUM 5 MG/1
1 TABLET ORAL DAILY
COMMUNITY
Start: 2015-12-23 | End: 2018-02-07

## 2018-01-24 NOTE — TELEPHONE ENCOUNTER
Patient was in the ER at [facility] and was not able to get pain medication prescribed at his pharmacy because it is not coming from his PCP  Wanted to get pain medication filled       Hydrocodone-acetaminophen 5-325mg

## 2018-01-29 ENCOUNTER — TELEPHONE (OUTPATIENT)
Dept: FAMILY MEDICINE CLINIC | Facility: CLINIC | Age: 68
End: 2018-01-29

## 2018-01-29 NOTE — TELEPHONE ENCOUNTER
Ed bp is still ok when he is sitting, but with any kind of exertion his bp goes up   It was 180/68 friday

## 2018-01-30 NOTE — TELEPHONE ENCOUNTER
Patient will be following up with Cardiology as well per Kendall Leester from [facility]  We should also be receiving a call from patients daughter Leonora Mcdowell to schedule an appointment with you      NRN

## 2018-01-30 NOTE — TELEPHONE ENCOUNTER
Left message with Dunia Soria at [place] [address]  Asked that he call and advise that he received the message

## 2018-01-30 NOTE — TELEPHONE ENCOUNTER
SPOKE TO ANNE AT [facility] 162 DID NOT ANSWER    HE IS NOT TAKING AMLOPIDINE, HE IS TAKING HYDRALAZINE 50MG 1 1/2 TAB 3 X DAY AND CLONIDINE 0 2 MG BID

## 2018-02-01 DIAGNOSIS — E11.8 TYPE 2 DIABETES MELLITUS WITH COMPLICATIONS (HCC): ICD-10-CM

## 2018-02-01 DIAGNOSIS — I10 ESSENTIAL (PRIMARY) HYPERTENSION: ICD-10-CM

## 2018-02-01 DIAGNOSIS — E11.65 TYPE 2 DIABETES MELLITUS WITH HYPERGLYCEMIA (HCC): ICD-10-CM

## 2018-02-01 DIAGNOSIS — Z13.0 ENCOUNTER FOR SCREENING FOR DISEASES OF THE BLOOD AND BLOOD-FORMING ORGANS AND CERTAIN DISORDERS INVOLVING THE IMMUNE MECHANISM: ICD-10-CM

## 2018-02-01 DIAGNOSIS — Z12.5 ENCOUNTER FOR SCREENING FOR MALIGNANT NEOPLASM OF PROSTATE: ICD-10-CM

## 2018-02-01 DIAGNOSIS — N18.2 CHRONIC KIDNEY DISEASE, STAGE II (MILD): ICD-10-CM

## 2018-02-07 ENCOUNTER — OFFICE VISIT (OUTPATIENT)
Dept: FAMILY MEDICINE CLINIC | Facility: CLINIC | Age: 68
End: 2018-02-07
Payer: MEDICARE

## 2018-02-07 VITALS
DIASTOLIC BLOOD PRESSURE: 80 MMHG | WEIGHT: 282 LBS | BODY MASS INDEX: 40.37 KG/M2 | HEART RATE: 86 BPM | OXYGEN SATURATION: 97 % | HEIGHT: 70 IN | TEMPERATURE: 98.6 F | SYSTOLIC BLOOD PRESSURE: 190 MMHG

## 2018-02-07 DIAGNOSIS — E11.65 UNCONTROLLED TYPE 2 DIABETES MELLITUS WITH CHRONIC KIDNEY DISEASE, WITH LONG-TERM CURRENT USE OF INSULIN, UNSPECIFIED CKD STAGE: ICD-10-CM

## 2018-02-07 DIAGNOSIS — I10 ESSENTIAL HYPERTENSION: Primary | ICD-10-CM

## 2018-02-07 DIAGNOSIS — I10 ACCELERATED HYPERTENSION: ICD-10-CM

## 2018-02-07 DIAGNOSIS — I50.32 CHRONIC DIASTOLIC CONGESTIVE HEART FAILURE (HCC): ICD-10-CM

## 2018-02-07 DIAGNOSIS — Z79.4 UNCONTROLLED TYPE 2 DIABETES MELLITUS WITH CHRONIC KIDNEY DISEASE, WITH LONG-TERM CURRENT USE OF INSULIN, UNSPECIFIED CKD STAGE: ICD-10-CM

## 2018-02-07 DIAGNOSIS — E11.22 UNCONTROLLED TYPE 2 DIABETES MELLITUS WITH CHRONIC KIDNEY DISEASE, WITH LONG-TERM CURRENT USE OF INSULIN, UNSPECIFIED CKD STAGE: ICD-10-CM

## 2018-02-07 DIAGNOSIS — S70.12XS: ICD-10-CM

## 2018-02-07 DIAGNOSIS — N18.2 CHRONIC KIDNEY DISEASE (CKD), STAGE II (MILD): ICD-10-CM

## 2018-02-07 DIAGNOSIS — R60.0 LOCALIZED EDEMA: ICD-10-CM

## 2018-02-07 DIAGNOSIS — K59.03 DRUG-INDUCED CONSTIPATION: ICD-10-CM

## 2018-02-07 DIAGNOSIS — D62 ANEMIA DUE TO ACUTE BLOOD LOSS: ICD-10-CM

## 2018-02-07 PROBLEM — E21.4 PARATHYROID DYSFUNCTION (HCC): Status: ACTIVE | Noted: 2017-11-25

## 2018-02-07 PROBLEM — L23.9 ALLERGIC DERMATITIS: Status: ACTIVE | Noted: 2017-10-16

## 2018-02-07 PROBLEM — E55.9 VITAMIN D DEFICIENCY: Status: ACTIVE | Noted: 2017-11-25

## 2018-02-07 PROBLEM — R91.1 PULMONARY NODULE SEEN ON IMAGING STUDY: Status: ACTIVE | Noted: 2018-02-07

## 2018-02-07 PROCEDURE — 99495 TRANSJ CARE MGMT MOD F2F 14D: CPT | Performed by: FAMILY MEDICINE

## 2018-02-07 RX ORDER — TRAMADOL HYDROCHLORIDE 50 MG/1
1 TABLET ORAL EVERY 12 HOURS PRN
Refills: 0 | COMMUNITY
Start: 2018-01-08 | End: 2019-01-30 | Stop reason: ALTCHOICE

## 2018-02-07 RX ORDER — AMLODIPINE BESYLATE 5 MG/1
5 TABLET ORAL DAILY
Qty: 30 TABLET | Refills: 2 | Status: SHIPPED | OUTPATIENT
Start: 2018-02-07 | End: 2018-02-08

## 2018-02-07 NOTE — PATIENT INSTRUCTIONS
Constipation: Stool softener twice a day  Prunes daily for contstipation  Add amlodipine to 5mg tab daily   Schedule follow up with nephrology- kidney doctor  1) Schedule with Dr Noel Lopez of chest without contrast: for follow up 6mm nodule   Visiting nurse twice a week for bp check-   Daily am weight    call on Friday with weight and blood pressure

## 2018-02-07 NOTE — PROGRESS NOTES
Assessment/Plan:      Diagnoses and all orders for this visit:    Essential hypertension  -     amLODIPine (NORVASC) 5 mg tablet; Take 1 tablet (5 mg total) by mouth daily    Drug-induced constipation    Uncontrolled type 2 diabetes mellitus with chronic kidney disease, with long-term current use of insulin, unspecified CKD stage (HCC)    Chronic diastolic congestive heart failure (HCC)    Accelerated hypertension    Chronic kidney disease (CKD), stage II (mild)    Localized edema    Anemia due to acute blood loss    Other orders  -     traMADol (ULTRAM) 50 mg tablet; Take 1 tablet by mouth every 12 (twelve) hours as needed      constipation due to tramadol, using for left sided lower back pain  Avoid exlax and use stool softeners and miralax  Chf: pt will schedule with cardiology, furosemide decreased from 80 to 40mg by nephrology due to kidney function  Accelerated hypertension, systolic  Restart amlodipine 5mg daily   Dm type 2 uncontolled, hba1c done in hospital was over 10  Depression: daughter will observe any symptoms  Pt does not want to take any additional medications currently  Anemia will need follow up cbc with visiting nurse  Will need follow up on 6mm pulmonary nodule found on ct abdomen/pelvis   ckd Pt will schedule follow up with nephrology  Home nurse to call with blood pressure on Friday and pt to call with daily weight in 2 days  Subjective:     Patient ID: Neda Pena is a 70 y o  male  Pt here with his daughter today  in follow up to hospitalization  Was unable to make it for a TCM  Still too much pain with ambulating  Pt had an injury to the left side of his back and did not think much about it at the time but because he was on lovenox prior to his surgery, had hematomas over his lower back, left side  Pt was anemic and had 4 units of blood Still with bruising on left side of back around to groin  Taking percocet but getting constipated  Took exlax and now with diarrhea   Pt feeling very tired and depressed  Does not want to take any more pills  He is off coumadin currently  He will make an appointment with cardiology: CHF, fluid in hands and right leg  Blood pressure is elevated  Denies chest pain but has shortness of breath with mild exertion  Will need evaluation  Had ct abdomen/pelvis and noted a 6mm nodule in lung  Will need follow up once patient improves  - ct chest  Visiting nurse coming twice a week  Take blood pressure and had physical therapy coming twice a week  Review of Systems   Constitutional: Positive for activity change, fatigue and unexpected weight change  Negative for diaphoresis and fever  HENT: Negative  Eyes: Negative  Respiratory: Positive for shortness of breath  Negative for cough and chest tightness  Cardiovascular: Positive for leg swelling  Negative for chest pain and palpitations  Gastrointestinal: Positive for constipation and diarrhea  Endocrine: Negative  Genitourinary: Negative  Musculoskeletal: Positive for back pain and gait problem  Skin:        Ecchymosis/hematoma left back    Neurological: Positive for weakness  Negative for light-headedness and headaches  Hematological: Negative  Psychiatric/Behavioral: Positive for dysphoric mood  Negative for suicidal ideas  The following portions of the patient's history were reviewed and updated as appropriate: allergies, current medications, past family history, past medical history, past social history, past surgical history and problem list     Objective:  Vitals:    02/07/18 1511   BP: (!) 190/80   Pulse:    Temp:    SpO2:       Physical Exam   Constitutional: He appears well-developed and well-nourished  Morbid obesity bmi greater than 40   HENT:   Head: Normocephalic and atraumatic  Right Ear: External ear normal    Left Ear: External ear normal    Nose: Nose normal    Mouth/Throat: Oropharynx is clear and moist    Neck: Normal range of motion  Neck supple  Cardiovascular: Normal rate, regular rhythm and normal heart sounds  Pulmonary/Chest: Effort normal  He has rales  Abdominal: Soft  Bowel sounds are normal    Musculoskeletal: He exhibits edema and tenderness  Tenderness left lower back   Skin: There is erythema     Psychiatric: Judgment and thought content normal

## 2018-02-08 ENCOUNTER — TELEPHONE (OUTPATIENT)
Dept: FAMILY MEDICINE CLINIC | Facility: CLINIC | Age: 68
End: 2018-02-08

## 2018-02-08 DIAGNOSIS — I10 ESSENTIAL HYPERTENSION: ICD-10-CM

## 2018-02-08 DIAGNOSIS — E78.49 OTHER HYPERLIPIDEMIA: Primary | ICD-10-CM

## 2018-02-08 RX ORDER — AMLODIPINE BESYLATE 10 MG/1
10 TABLET ORAL DAILY
Qty: 30 TABLET | Refills: 5 | Status: SHIPPED | OUTPATIENT
Start: 2018-02-08 | End: 2019-01-30 | Stop reason: ALTCHOICE

## 2018-02-08 RX ORDER — ATORVASTATIN CALCIUM 40 MG/1
40 TABLET, FILM COATED ORAL DAILY
Qty: 30 TABLET | Refills: 5 | Status: SHIPPED | OUTPATIENT
Start: 2018-02-08 | End: 2018-02-25 | Stop reason: SDUPTHER

## 2018-02-08 NOTE — TELEPHONE ENCOUNTER
RADAMES AT [facility] REGARDING SOTO  THERE IS AN INTERACTION WITH AMLODIPINE AND ZOCOR  DO YOU WANT HIM TO CONTINUE? ALSO BP READINGS ARE AS FOLLOWS:    SITTING /74  STANDING /78  TAKEN IN LEFT ARM      PLEASE ADVISE .[phone]

## 2018-02-09 ENCOUNTER — TELEPHONE (OUTPATIENT)
Dept: FAMILY MEDICINE CLINIC | Facility: CLINIC | Age: 68
End: 2018-02-09

## 2018-02-09 NOTE — TELEPHONE ENCOUNTER
After Visit Summary   4/13/2017    Daria Jaffe    MRN: [id number]           Patient Information     Date Of Birth          1986        Visit Information        Provider Department      4/13/2017 9:00 AM RI PRENATAL NURSE [facility]        Today's Diagnoses     Supervision of other normal pregnancy, antepartum    -  1       Follow-ups after your visit        Your next 10 appointments already scheduled     Apr 26, 2017  9:45 AM CDT   US OB < 14 WEEKS WITH TRANSVAGINAL SINGLE with RIUS1   [facility])    [address] [place] [address]   [phone]           Please bring a list of your medicines (including vitamins, minerals and over-the-counter drugs). Also, tell your doctor about any allergies you may have. Wear comfortable clothes and leave your valuables at home.  If you re less than 20 weeks drink four 8-ounce glasses of fluid an hour before your exam. If you need to empty your bladder before your exam, try to release only a little urine. Then, drink another glass of fluid.  You may have up to two family members in the exam room. If you bring a small child, an adult must be there to care for him or her.  Please call the Imaging Department at your exam site with any questions.            May 17, 2017  1:00 PM CDT   New Prenatal with Tiffanie Francis MD   [facility] ([facility])    [address] [place] [address]   [phone]            Jun 14, 2017 10:00 AM CDT   ESTABLISHED PRENATAL with Rosario Rajan MD   [facility] ([facility])    [address] [place] [address]   [phone]              Future tests that were ordered for you today     Open Future Orders        Priority Expected Expires Ordered    US OB <14 Weeks w Transvaginal Single Routine  4/13/2018 4/13/2017            Who to contact   faxed "   If you have questions or need follow up information about today's clinic visit or your schedule please contact [facility] directly at [phone].  Normal or non-critical lab and imaging results will be communicated to you by MyChart, letter or phone within 4 business days after the clinic has received the results. If you do not hear from us within 7 days, please contact the clinic through [email or web address] or phone. If you have a critical or abnormal lab result, we will notify you by phone as soon as possible.  Submit refill requests through [employer] or call your pharmacy and they will forward the refill request to us. Please allow 3 business days for your refill to be completed.          Additional Information About Your Visit        [facility] Information     [employer] lets you send messages to your doctor, view your test results, renew your prescriptions, schedule appointments and more. To sign up, go to [email or web address] . Click on \"Log in\" on the left side of the screen, which will take you to the Welcome page. Then click on \"Sign up Now\" on the right side of the page.     You will be asked to enter the access code listed below, as well as some personal information. Please follow the directions to create your username and password.     Your access code is: [email or web address]  Expires: 2017 11:38 AM     Your access code will  in 90 days. If you need help or a new code, please call your [place] clinic or [phone].        Care EveryWhere ID     This is your Care EveryWhere ID. This could be used by other organizations to access your [place] medical records  [id number]        Your Vitals Were     Last Period                   2016 (Exact Date)            Blood Pressure from Last 3 Encounters:   16 118/86   11/25/15 124/81   08/13/15 102/68    Weight from Last 3 Encounters:   16 173 lb 6.4 oz (78.7 kg)   11/24/15 178 lb 6.4 oz (80.9 kg)   08/13/15 170 lb (77.1 kg)         "      We Performed the Following     ABO/Rh type and screen     Anti Treponema     Beta HCG qual IFA urine     CBC with platelets     Hepatitis B surface antigen     HIV Antigen Antibody Combo     Rubella Antibody IgG Quantitative     Urine Culture Aerobic Bacterial        Primary Care Provider Office Phone # Fax #    Katy Marco Jaquez [phone]       [facility] [address] [facility] [address]  [facility] [address]        Thank you!     Thank you for choosing [facility]  for your care. Our goal is always to provide you with excellent care. Hearing back from our patients is one way we can continue to improve our services. Please take a few minutes to complete the written survey that you may receive in the mail after your visit with us. Thank you!             Your Updated Medication List - Protect others around you: Learn how to safely use, store and throw away your medicines at www.disposemymeds.org.      Notice  As of 4/13/2017 11:38 AM    You have not been prescribed any medications.

## 2018-02-09 NOTE — TELEPHONE ENCOUNTER
William Montes called from cardiology asking for paperwork on PT last visit  Chart kept freezing when I tried to print to fax   Fax [phone]

## 2018-02-09 NOTE — TELEPHONE ENCOUNTER
Spoke to ed and gave him instructions, he has appt with cardiology on Monday, and his edema is going down      Send this back for uli's return call

## 2018-02-10 DIAGNOSIS — IMO0002 UNCONTROLLED TYPE 2 DIABETES MELLITUS WITH OTHER CIRCULATORY COMPLICATION, WITH LONG-TERM CURRENT USE OF INSULIN: Primary | ICD-10-CM

## 2018-02-10 RX ORDER — INSULIN ASPART 100 [IU]/ML
INJECTION, SOLUTION INTRAVENOUS; SUBCUTANEOUS
Qty: 30 ML | Refills: 0 | Status: SHIPPED | OUTPATIENT
Start: 2018-02-10 | End: 2018-05-29 | Stop reason: SDUPTHER

## 2018-02-12 ENCOUNTER — TELEPHONE (OUTPATIENT)
Dept: FAMILY MEDICINE CLINIC | Facility: CLINIC | Age: 68
End: 2018-02-12

## 2018-02-12 NOTE — TELEPHONE ENCOUNTER
Ashwini Like says that pt has been taking Simvastatin for years however when he was in last week he was written for Atorvastatin  She thought maybe it was because the simvastatin was not on the meds list that she supplied you  Now he has been taking both  Please advise    Ashwini Like [phone]

## 2018-02-14 ENCOUNTER — TELEPHONE (OUTPATIENT)
Dept: FAMILY MEDICINE CLINIC | Facility: CLINIC | Age: 68
End: 2018-02-14

## 2018-02-14 DIAGNOSIS — S70.12XS: Primary | ICD-10-CM

## 2018-02-14 DIAGNOSIS — M62.81 GENERALIZED MUSCLE WEAKNESS: ICD-10-CM

## 2018-02-14 NOTE — TELEPHONE ENCOUNTER
Trinh Norton calling stating that PT was discharged from home physical therapy but recommend pt continue receiving outpatient physical therapy   If being continued wasn't sure if a script or referral was needed

## 2018-02-25 RX ORDER — ATORVASTATIN CALCIUM 80 MG/1
80 TABLET, FILM COATED ORAL DAILY
Refills: 0 | COMMUNITY
Start: 2018-02-15

## 2018-02-26 ENCOUNTER — OFFICE VISIT (OUTPATIENT)
Dept: FAMILY MEDICINE CLINIC | Facility: CLINIC | Age: 68
End: 2018-02-26
Payer: MEDICARE

## 2018-02-26 VITALS
OXYGEN SATURATION: 98 % | DIASTOLIC BLOOD PRESSURE: 80 MMHG | HEART RATE: 71 BPM | WEIGHT: 284 LBS | SYSTOLIC BLOOD PRESSURE: 130 MMHG | BODY MASS INDEX: 40.66 KG/M2 | HEIGHT: 70 IN | TEMPERATURE: 97.6 F

## 2018-02-26 DIAGNOSIS — E11.22 UNCONTROLLED TYPE 2 DIABETES MELLITUS WITH CHRONIC KIDNEY DISEASE, WITH LONG-TERM CURRENT USE OF INSULIN, UNSPECIFIED CKD STAGE: ICD-10-CM

## 2018-02-26 DIAGNOSIS — K59.00 CONSTIPATION, UNSPECIFIED CONSTIPATION TYPE: ICD-10-CM

## 2018-02-26 DIAGNOSIS — E11.65 UNCONTROLLED TYPE 2 DIABETES MELLITUS WITH CHRONIC KIDNEY DISEASE, WITH LONG-TERM CURRENT USE OF INSULIN, UNSPECIFIED CKD STAGE: ICD-10-CM

## 2018-02-26 DIAGNOSIS — I10 BENIGN ESSENTIAL HYPERTENSION: ICD-10-CM

## 2018-02-26 DIAGNOSIS — S30.1XXD GROIN HEMATOMA, SUBSEQUENT ENCOUNTER: Primary | ICD-10-CM

## 2018-02-26 DIAGNOSIS — R42 VERTIGO: ICD-10-CM

## 2018-02-26 DIAGNOSIS — IMO0002 UNCONTROLLED TYPE 2 DIABETES MELLITUS WITH COMPLICATION, WITH LONG-TERM CURRENT USE OF INSULIN: ICD-10-CM

## 2018-02-26 DIAGNOSIS — Z79.4 UNCONTROLLED TYPE 2 DIABETES MELLITUS WITH CHRONIC KIDNEY DISEASE, WITH LONG-TERM CURRENT USE OF INSULIN, UNSPECIFIED CKD STAGE: ICD-10-CM

## 2018-02-26 PROCEDURE — 99214 OFFICE O/P EST MOD 30 MIN: CPT | Performed by: FAMILY MEDICINE

## 2018-02-26 RX ORDER — MECLIZINE HYDROCHLORIDE 25 MG/1
25 TABLET ORAL EVERY 8 HOURS PRN
Qty: 30 TABLET | Refills: 0 | Status: SHIPPED | OUTPATIENT
Start: 2018-02-26 | End: 2018-03-22 | Stop reason: SDUPTHER

## 2018-02-26 NOTE — PROGRESS NOTES
Assessment/Plan:      Diagnoses and all orders for this visit:    Groin hematoma, subsequent encounter    Uncontrolled type 2 diabetes mellitus with chronic kidney disease, with long-term current use of insulin, unspecified CKD stage (HCC)    Vertigo  -     meclizine (ANTIVERT) 25 mg tablet; Take 1 tablet (25 mg total) by mouth every 8 (eight) hours as needed for dizziness    Constipation, unspecified constipation type    Benign essential hypertension    Uncontrolled type 2 diabetes mellitus with complication, with long-term current use of insulin (HCC)       Hematoma left groin:  Resolving, hesitant to refer for drainage   Unless blood count is elevated or showing signs of infection, increased redness warmth or fever  Favor a loculated hematoma verses  Infectious process  Due to his type 2 diabetes the area would be  Slow to heal   If blood count is elevated, consider wound care evaluation  Other a hematomas  Left buttock and  Left back also resolving    Diabetes type 2: Will need additional blood work but will request blood work from Cardiology  Will likely need urine microalbumin  Has had blood work done by both Cardiology and Nephrology recently per patient  Vertigo:  Mild, trial of meclizine 1 tablet 3 times a day as needed  Constipation:  Patient will try Ex-Lax since this has worked for him well in the past if no results he may try either milk of magnesia or Mag citrate  Hypertension: Controlled, continue current medications  Patient will keep follow-up with  Specialist cardiology, nephrology and rheumatology  Subjective:     Patient ID: Princess Barone is a 70 y o  male  Patient is here with his daughter today  He wasSeen by nephrologist  Who increased his water pill to 160 mg daily  He notices a difference and increased urination  Had blood work done 2 weeks ago, Dr Eduarda Gao,  His cardiologist, with a recheck in April    And additional blood work prior     patient complains of Some intermittentvertigo with looking up and turning head  He denies any head trauma  He denies any recent illnesses  He complains ofSymptoms in his left groin for 1 week where the hematoma is, noticed more painful over the week  History of mrsa 2013  The area has not been warm to touch  He gets some pain into his upper leg  Patient states Sugars have been pretty good 175 this am  As low as 70 in am      Patient has not moved bowels for 4 days  Ex-Lax usually works for him  He will  Try the medication today and was wondering what he can do if  he had no results           Review of Systems   Constitutional: Negative  Negative for fever  HENT: Negative  Eyes: Negative  Respiratory: Negative  Cardiovascular: Negative  Gastrointestinal: Positive for constipation  Negative for abdominal pain, blood in stool, nausea and vomiting  Endocrine: Negative  Genitourinary: Negative  Musculoskeletal:        Left groin pain   Skin:        Hematoma left groin   Allergic/Immunologic: Negative  Neurological: Positive for dizziness  Hematological:        Hematoma left groin   Psychiatric/Behavioral: Negative  The following portions of the patient's history were reviewed and updated as appropriate: allergies, current medications, past family history, past medical history, past social history, past surgical history and problem list     Objective:  Vitals:    02/26/18 0912   BP: 146/90   Pulse: 71   Temp: 97 6 °F (36 4 °C)   SpO2: 98%      Physical Exam   Constitutional: He is oriented to person, place, and time  He appears well-developed and well-nourished  HENT:   Head: Normocephalic and atraumatic  Right Ear: External ear normal    Left Ear: External ear normal    Nose: Nose normal    Mouth/Throat: Oropharynx is clear and moist    Cardiovascular: Normal rate, regular rhythm and normal heart sounds      Pulmonary/Chest: Effort normal and breath sounds normal    Neurological: He is alert and oriented to person, place, and time  Skin: Skin is warm and dry  Left groin loculated area likely resolving hematoma in a compressed area  No obvious signs of infection, no erythema or increased warmth   Psychiatric: He has a normal mood and affect   His behavior is normal  Judgment and thought content normal

## 2018-02-26 NOTE — PATIENT INSTRUCTIONS
Get blood work done, cbc to check for signs of infection  Continue to follow up with nephrologist, April  Continue follow up with cardiology in April  Consider wound care for evaluation   Ok for tylenol   No advil, ibuprofen, motrin  Meclizine 1 tab 3 times a day as needed for dizziness   Constipation:  Patient will try Ex-Lax or milk of magnesia    If no results then he can get Mag citrate

## 2018-02-27 RX ORDER — FUROSEMIDE 80 MG
80 TABLET ORAL 2 TIMES DAILY
COMMUNITY

## 2018-02-28 NOTE — RESULT NOTES
Verified Results  Coumadin Flow Sheet [id number] 06:39AM Susanne Large     Test Name Result Flag Reference   Recheck INR [id number]     New Dose      7 5 th,f and resume 7 5mg m and 5mg other days

## 2018-03-01 ENCOUNTER — TRANSCRIBE ORDERS (OUTPATIENT)
Dept: FAMILY MEDICINE CLINIC | Facility: CLINIC | Age: 68
End: 2018-03-01

## 2018-03-01 ENCOUNTER — TELEPHONE (OUTPATIENT)
Dept: FAMILY MEDICINE CLINIC | Facility: CLINIC | Age: 68
End: 2018-03-01

## 2018-03-01 NOTE — TELEPHONE ENCOUNTER
PATIENT CALLED TO SEE HOW HIS LABS WERE  SITE OF HEMATOMA IS GETTING BIGGER AND PAIN IN PROSTHETIC LEG  [facility]  I WILL PUT ON YOUR LAB DESK      PLEASE ADVISE PATIENT OF INTENTIONS     [phone]  OR  [phone]

## 2018-03-02 ENCOUNTER — TELEPHONE (OUTPATIENT)
Dept: FAMILY MEDICINE CLINIC | Facility: CLINIC | Age: 68
End: 2018-03-02

## 2018-03-05 ENCOUNTER — TELEPHONE (OUTPATIENT)
Dept: FAMILY MEDICINE CLINIC | Facility: CLINIC | Age: 68
End: 2018-03-05

## 2018-03-05 NOTE — TELEPHONE ENCOUNTER
Asking about labs he had completed at [address] [place] [address] asking about results and if drainage will be needed

## 2018-03-06 ENCOUNTER — TELEPHONE (OUTPATIENT)
Dept: FAMILY MEDICINE CLINIC | Facility: CLINIC | Age: 68
End: 2018-03-06

## 2018-03-06 NOTE — TELEPHONE ENCOUNTER
See other task,  Blood count did not show signs of infection but still with anemia- improved from when in hospital   Other task says can get evaluated by wound care, or general surgeon   Or ed if getting worse

## 2018-03-06 NOTE — TELEPHONE ENCOUNTER
How is leg doing? Has collection of fluid in left groin area  Can go to wound care, general surgeon or ed for further evaluation

## 2018-03-06 NOTE — TELEPHONE ENCOUNTER
UPDATE ON PATIENT PER [place] ED DOCTOR:     Patient presented to ED based on our recent communication with him  His hemoglobin has come up from when it was drawn there in February  Ultrasound was performed on the groin wound, which came back normal     Patient is being diagnosed with hematoma of the groin region and being d/c from the ED

## 2018-03-20 ENCOUNTER — FOLLOW UP (OUTPATIENT)
Dept: URBAN - METROPOLITAN AREA CLINIC 44 | Facility: CLINIC | Age: 68
End: 2018-03-20

## 2018-03-20 DIAGNOSIS — E11.3313: ICD-10-CM

## 2018-03-20 DIAGNOSIS — Z79.4: ICD-10-CM

## 2018-03-20 PROCEDURE — 92012 INTRM OPH EXAM EST PATIENT: CPT | Mod: 25

## 2018-03-20 PROCEDURE — 92134 CPTRZ OPH DX IMG PST SGM RTA: CPT

## 2018-03-20 PROCEDURE — 67028 INJECTION EYE DRUG: CPT

## 2018-03-20 ASSESSMENT — VISUAL ACUITY
OD_SC: 20/50-1
OS_SC: 20/30-1

## 2018-03-20 ASSESSMENT — TONOMETRY
OS_IOP_MMHG: 21
OD_IOP_MMHG: 20

## 2018-03-22 DIAGNOSIS — R42 VERTIGO: ICD-10-CM

## 2018-03-23 RX ORDER — MECLIZINE HYDROCHLORIDE 25 MG/1
TABLET ORAL
Qty: 30 TABLET | Refills: 0 | Status: SHIPPED | OUTPATIENT
Start: 2018-03-23 | End: 2018-06-21 | Stop reason: SDUPTHER

## 2018-03-27 ENCOUNTER — PROCEDURE ONLY (OUTPATIENT)
Dept: URBAN - METROPOLITAN AREA CLINIC 44 | Facility: CLINIC | Age: 68
End: 2018-03-27

## 2018-03-27 DIAGNOSIS — Z79.4: ICD-10-CM

## 2018-03-27 DIAGNOSIS — E11.3313: ICD-10-CM

## 2018-03-27 PROCEDURE — 67028 INJECTION EYE DRUG: CPT

## 2018-03-27 ASSESSMENT — TONOMETRY: OS_IOP_MMHG: 17

## 2018-03-27 ASSESSMENT — VISUAL ACUITY: OS_CC: 20/40+1

## 2018-04-15 DIAGNOSIS — Z79.4 TYPE 2 DIABETES MELLITUS WITH DIABETIC NEUROPATHY, WITH LONG-TERM CURRENT USE OF INSULIN (HCC): Primary | ICD-10-CM

## 2018-04-15 DIAGNOSIS — E11.40 TYPE 2 DIABETES MELLITUS WITH DIABETIC NEUROPATHY, WITH LONG-TERM CURRENT USE OF INSULIN (HCC): Primary | ICD-10-CM

## 2018-04-15 RX ORDER — INSULIN GLARGINE 100 [IU]/ML
INJECTION, SOLUTION SUBCUTANEOUS
Qty: 30 ML | Refills: 0 | Status: SHIPPED | OUTPATIENT
Start: 2018-04-15 | End: 2018-08-18 | Stop reason: SDUPTHER

## 2018-04-21 DIAGNOSIS — I10 ESSENTIAL HYPERTENSION: Primary | ICD-10-CM

## 2018-04-22 RX ORDER — CLONIDINE HYDROCHLORIDE 0.2 MG/1
TABLET ORAL
Qty: 180 TABLET | Refills: 0 | Status: SHIPPED | OUTPATIENT
Start: 2018-04-22 | End: 2019-01-02 | Stop reason: SDUPTHER

## 2018-04-30 ENCOUNTER — TELEPHONE (OUTPATIENT)
Dept: FAMILY MEDICINE CLINIC | Facility: CLINIC | Age: 68
End: 2018-04-30

## 2018-04-30 NOTE — TELEPHONE ENCOUNTER
PATIENT IS REQUESTING HIS LABS FOR [facility]  PLEASE PUT IN LABS    MAIL TO PATIENT    Negrito Lange

## 2018-05-17 ENCOUNTER — TELEPHONE (OUTPATIENT)
Dept: FAMILY MEDICINE CLINIC | Facility: CLINIC | Age: 68
End: 2018-05-17

## 2018-05-17 ENCOUNTER — OFFICE VISIT (OUTPATIENT)
Dept: FAMILY MEDICINE CLINIC | Facility: CLINIC | Age: 68
End: 2018-05-17
Payer: MEDICARE

## 2018-05-17 VITALS
SYSTOLIC BLOOD PRESSURE: 128 MMHG | HEIGHT: 70 IN | DIASTOLIC BLOOD PRESSURE: 70 MMHG | OXYGEN SATURATION: 98 % | WEIGHT: 277 LBS | HEART RATE: 61 BPM | TEMPERATURE: 98.3 F | BODY MASS INDEX: 39.65 KG/M2

## 2018-05-17 DIAGNOSIS — E03.9 ACQUIRED HYPOTHYROIDISM: ICD-10-CM

## 2018-05-17 DIAGNOSIS — E11.65 UNCONTROLLED TYPE 2 DIABETES MELLITUS WITH CHRONIC KIDNEY DISEASE, WITH LONG-TERM CURRENT USE OF INSULIN, UNSPECIFIED CKD STAGE: ICD-10-CM

## 2018-05-17 DIAGNOSIS — K59.09 OTHER CONSTIPATION: Primary | ICD-10-CM

## 2018-05-17 DIAGNOSIS — N18.2 CHRONIC KIDNEY DISEASE (CKD), STAGE II (MILD): ICD-10-CM

## 2018-05-17 DIAGNOSIS — I10 BENIGN ESSENTIAL HYPERTENSION: ICD-10-CM

## 2018-05-17 DIAGNOSIS — Z79.4 UNCONTROLLED TYPE 2 DIABETES MELLITUS WITH CHRONIC KIDNEY DISEASE, WITH LONG-TERM CURRENT USE OF INSULIN, UNSPECIFIED CKD STAGE: ICD-10-CM

## 2018-05-17 DIAGNOSIS — E11.22 UNCONTROLLED TYPE 2 DIABETES MELLITUS WITH CHRONIC KIDNEY DISEASE, WITH LONG-TERM CURRENT USE OF INSULIN, UNSPECIFIED CKD STAGE: ICD-10-CM

## 2018-05-17 PROCEDURE — 99214 OFFICE O/P EST MOD 30 MIN: CPT | Performed by: FAMILY MEDICINE

## 2018-05-17 RX ORDER — LEVOTHYROXINE SODIUM 0.03 MG/1
25 TABLET ORAL DAILY
Qty: 30 TABLET | Refills: 5 | Status: SHIPPED | OUTPATIENT
Start: 2018-05-17

## 2018-05-17 NOTE — PROGRESS NOTES
Assessment/Plan:      Diagnoses and all orders for this visit:    Other constipation    Acquired hypothyroidism  -     levothyroxine 25 mcg tablet; Take 1 tablet (25 mcg total) by mouth daily    Uncontrolled type 2 diabetes mellitus with chronic kidney disease, with long-term current use of insulin, unspecified CKD stage (HCC)    Chronic kidney disease (CKD), stage II (mild)    Benign essential hypertension        Constipation:  Patient will try MiraLax and stool softeners,  instead of Ex-Lax and milk of magnesia  Hypothyroid:  Patient will start levothyroxine 25 mcg 1 tablet daily 1st thing in the morning prior to breakfast with glass of water, no eating for release 30 min  Diabetes type 2:  Uncontrolled, last hemoglobin A1c was 10 6  Dietary changes discussed, patient has had a HEPA glycemic episode of 60 so will not adjust Lantus, will try adjusting NovoLog with increase dose if blood sugars were greater than 150  Patient will call with numbers in 1 week  Hypertension:  Controlled, continue current medications  Subjective:     Patient ID: Jackeline Rivas is a 70 y o  male  Pt is here to discuss recent blood work  Some ordered by cardiology and nephrology  We ordered thyroid and psa level  Pt ih hypothyroid  Pt having trouble with constipation  Patient has had symptoms over the past month and a half  He denies any changes in medication or dietary habits  He uses milk of Magnesia and Ex-Lax to have a bowel movement every 4th day  Then he is in the bathroom with diarrhea  He is trying to prepare for surgery that was postponed, gastric bypass  His hematoma on his back is gradually resolving  There is some residual over his left lower back  He denies any chest pains or shortness of breath  His most recent hemoglobin A1c was 10 6  He states he snacks in the evening and his morning blood sugars are very high in the 200s    He is using Lantus of 30 and NovoLog of 8 units 3 times a day with meals         Review of Systems   Constitutional: Positive for fatigue  Negative for fever  HENT: Negative  Eyes: Negative  Respiratory: Negative  Cardiovascular: Negative  Gastrointestinal: Positive for constipation  Negative for abdominal pain and nausea  Endocrine: Negative  Musculoskeletal: Negative  Skin: Negative  Allergic/Immunologic: Negative  Hematological: Negative  Psychiatric/Behavioral: Negative  The following portions of the patient's history were reviewed and updated as appropriate: allergies, current medications, past family history, past medical history, past social history, past surgical history and problem list     Objective:  Vitals:    05/17/18 0902   BP: 128/70   Pulse: 61   Temp: 98 3 °F (36 8 °C)   SpO2: 98%   Weight: 126 kg (277 lb)   Height: 5' 10" (1 778 m)      Physical Exam   Constitutional: He is oriented to person, place, and time  He appears well-developed and well-nourished  HENT:   Head: Normocephalic and atraumatic  Cardiovascular: Normal rate, regular rhythm and normal heart sounds  Pulmonary/Chest: Effort normal and breath sounds normal    Abdominal: Soft  Bowel sounds are normal    Neurological: He is alert and oriented to person, place, and time  Skin: Skin is warm and dry  Psychiatric: He has a normal mood and affect  His behavior is normal  Judgment and thought content normal    Nursing note and vitals reviewed

## 2018-05-17 NOTE — TELEPHONE ENCOUNTER
Should be at the top of his discharge summary  He should be taking colace twice a day, stool softener  And miralax 1 cap in 8 ounces of water daily until bms normal   Call if not working, can increase number of caps used    Started him on thyroid supplement so this should also help with his constipation  Reviewed all of this at his office visit today

## 2018-05-17 NOTE — PATIENT INSTRUCTIONS
Colace twice a day continue daily  miralax every day until bowels are normal, then as needed if no bowel movement in 24 hours  Avoid exlax and milk of magnesia  Add levothyroxine 25 mcg 1 tab daily recheck TSH in 4 weeks   If sugars are greater than 150 use 10u of novolog, and if greater than 200 use 12 units of NovoLog  Call in 1 week with blood sugars    Avoid snacking

## 2018-05-22 ENCOUNTER — FOLLOW UP (OUTPATIENT)
Dept: URBAN - METROPOLITAN AREA CLINIC 44 | Facility: CLINIC | Age: 68
End: 2018-05-22

## 2018-05-22 DIAGNOSIS — Z79.4: ICD-10-CM

## 2018-05-22 DIAGNOSIS — E11.3313: ICD-10-CM

## 2018-05-22 DIAGNOSIS — H43.393: ICD-10-CM

## 2018-05-22 DIAGNOSIS — Z96.1: ICD-10-CM

## 2018-05-22 PROCEDURE — 92014 COMPRE OPH EXAM EST PT 1/>: CPT | Mod: 25

## 2018-05-22 PROCEDURE — 92134 CPTRZ OPH DX IMG PST SGM RTA: CPT

## 2018-05-22 PROCEDURE — 67028 INJECTION EYE DRUG: CPT

## 2018-05-22 ASSESSMENT — TONOMETRY
OD_IOP_MMHG: 18
OS_IOP_MMHG: 21

## 2018-05-22 ASSESSMENT — VISUAL ACUITY
OS_SC: 20/40
OD_SC: 20/60

## 2018-05-29 ENCOUNTER — PROCEDURE ONLY (OUTPATIENT)
Dept: URBAN - METROPOLITAN AREA CLINIC 44 | Facility: CLINIC | Age: 68
End: 2018-05-29

## 2018-05-29 DIAGNOSIS — Z79.4: ICD-10-CM

## 2018-05-29 DIAGNOSIS — IMO0002 UNCONTROLLED TYPE 2 DIABETES MELLITUS WITH OTHER CIRCULATORY COMPLICATION, WITH LONG-TERM CURRENT USE OF INSULIN: ICD-10-CM

## 2018-05-29 DIAGNOSIS — E11.3313: ICD-10-CM

## 2018-05-29 PROCEDURE — 67028 INJECTION EYE DRUG: CPT

## 2018-05-29 ASSESSMENT — VISUAL ACUITY: OS_SC: 20/40-1

## 2018-05-29 ASSESSMENT — TONOMETRY: OS_IOP_MMHG: 22

## 2018-05-30 RX ORDER — INSULIN ASPART 100 [IU]/ML
INJECTION, SOLUTION INTRAVENOUS; SUBCUTANEOUS
Qty: 30 ML | Refills: 0 | Status: SHIPPED | OUTPATIENT
Start: 2018-05-30 | End: 2018-11-12 | Stop reason: SDUPTHER

## 2018-06-21 DIAGNOSIS — R42 VERTIGO: ICD-10-CM

## 2018-06-22 RX ORDER — MECLIZINE HYDROCHLORIDE 25 MG/1
TABLET ORAL
Qty: 30 TABLET | Refills: 5 | Status: SHIPPED | OUTPATIENT
Start: 2018-06-22 | End: 2019-01-05 | Stop reason: SDUPTHER

## 2018-06-26 DIAGNOSIS — M15.9 PRIMARY OSTEOARTHRITIS INVOLVING MULTIPLE JOINTS: Primary | ICD-10-CM

## 2018-06-27 RX ORDER — MELOXICAM 7.5 MG/1
TABLET ORAL
Qty: 90 TABLET | Refills: 1 | Status: SHIPPED | OUTPATIENT
Start: 2018-06-27 | End: 2019-06-06 | Stop reason: SDUPTHER

## 2018-08-10 DIAGNOSIS — I10 ESSENTIAL HYPERTENSION: ICD-10-CM

## 2018-08-10 RX ORDER — AMLODIPINE BESYLATE 5 MG/1
TABLET ORAL
Qty: 90 TABLET | Refills: 0 | Status: SHIPPED | OUTPATIENT
Start: 2018-08-10

## 2018-08-14 ENCOUNTER — FOLLOW UP (OUTPATIENT)
Dept: URBAN - METROPOLITAN AREA CLINIC 44 | Facility: CLINIC | Age: 68
End: 2018-08-14

## 2018-08-14 DIAGNOSIS — Z79.4: ICD-10-CM

## 2018-08-14 DIAGNOSIS — E11.3313: ICD-10-CM

## 2018-08-14 PROCEDURE — 92134 CPTRZ OPH DX IMG PST SGM RTA: CPT

## 2018-08-14 PROCEDURE — 67028 INJECTION EYE DRUG: CPT

## 2018-08-14 PROCEDURE — 92012 INTRM OPH EXAM EST PATIENT: CPT | Mod: 25

## 2018-08-14 ASSESSMENT — VISUAL ACUITY
OD_SC: 20/40
OS_SC: 20/60

## 2018-08-14 ASSESSMENT — TONOMETRY
OS_IOP_MMHG: 14
OD_IOP_MMHG: 15

## 2018-08-18 DIAGNOSIS — E11.40 TYPE 2 DIABETES MELLITUS WITH DIABETIC NEUROPATHY, WITH LONG-TERM CURRENT USE OF INSULIN (HCC): ICD-10-CM

## 2018-08-18 DIAGNOSIS — Z79.4 TYPE 2 DIABETES MELLITUS WITH DIABETIC NEUROPATHY, WITH LONG-TERM CURRENT USE OF INSULIN (HCC): ICD-10-CM

## 2018-08-20 DIAGNOSIS — Z79.4 TYPE 2 DIABETES MELLITUS WITH DIABETIC NEUROPATHY, WITH LONG-TERM CURRENT USE OF INSULIN (HCC): ICD-10-CM

## 2018-08-20 DIAGNOSIS — E11.40 TYPE 2 DIABETES MELLITUS WITH DIABETIC NEUROPATHY, WITH LONG-TERM CURRENT USE OF INSULIN (HCC): ICD-10-CM

## 2018-08-20 RX ORDER — INSULIN GLARGINE 100 [IU]/ML
INJECTION, SOLUTION SUBCUTANEOUS
Qty: 30 ML | Refills: 0 | Status: SHIPPED | OUTPATIENT
Start: 2018-08-20 | End: 2018-08-20 | Stop reason: SDUPTHER

## 2018-08-21 ENCOUNTER — PROCEDURE ONLY (OUTPATIENT)
Dept: URBAN - METROPOLITAN AREA CLINIC 44 | Facility: CLINIC | Age: 68
End: 2018-08-21

## 2018-08-21 DIAGNOSIS — E11.3313: ICD-10-CM

## 2018-08-21 DIAGNOSIS — Z79.4: ICD-10-CM

## 2018-08-21 PROCEDURE — 67028 INJECTION EYE DRUG: CPT

## 2018-08-21 ASSESSMENT — VISUAL ACUITY: OS_SC: 20/60+1

## 2018-08-21 ASSESSMENT — TONOMETRY: OS_IOP_MMHG: 14

## 2018-10-23 ENCOUNTER — FOLLOW UP (OUTPATIENT)
Dept: URBAN - METROPOLITAN AREA CLINIC 44 | Facility: CLINIC | Age: 68
End: 2018-10-23

## 2018-10-23 DIAGNOSIS — E11.3313: ICD-10-CM

## 2018-10-23 DIAGNOSIS — Z79.4: ICD-10-CM

## 2018-10-23 PROCEDURE — 92012 INTRM OPH EXAM EST PATIENT: CPT | Mod: 25

## 2018-10-23 PROCEDURE — 92134 CPTRZ OPH DX IMG PST SGM RTA: CPT

## 2018-10-23 PROCEDURE — 67028 INJECTION EYE DRUG: CPT

## 2018-10-23 ASSESSMENT — VISUAL ACUITY
OD_SC: 20/40-2
OS_SC: 20/60-1

## 2018-10-23 ASSESSMENT — TONOMETRY
OS_IOP_MMHG: 11
OD_IOP_MMHG: 10

## 2018-10-30 ENCOUNTER — PROCEDURE ONLY (OUTPATIENT)
Dept: URBAN - METROPOLITAN AREA CLINIC 44 | Facility: CLINIC | Age: 68
End: 2018-10-30

## 2018-10-30 DIAGNOSIS — E11.3313: ICD-10-CM

## 2018-10-30 DIAGNOSIS — Z79.4: ICD-10-CM

## 2018-10-30 LAB
LEFT EYE DIABETIC RETINOPATHY: NORMAL
RIGHT EYE DIABETIC RETINOPATHY: NORMAL
SEVERITY (EYE EXAM): NORMAL

## 2018-10-30 PROCEDURE — 67028 INJECTION EYE DRUG: CPT

## 2018-10-30 ASSESSMENT — VISUAL ACUITY: OS_SC: 20/60-1

## 2018-10-30 ASSESSMENT — TONOMETRY: OS_IOP_MMHG: 20

## 2018-11-12 DIAGNOSIS — E11.8 TYPE 2 DIABETES MELLITUS WITH COMPLICATION, WITH LONG-TERM CURRENT USE OF INSULIN (HCC): Primary | ICD-10-CM

## 2018-11-12 DIAGNOSIS — Z79.4 TYPE 2 DIABETES MELLITUS WITH COMPLICATION, WITH LONG-TERM CURRENT USE OF INSULIN (HCC): Primary | ICD-10-CM

## 2018-11-13 RX ORDER — INSULIN ASPART 100 [IU]/ML
INJECTION, SOLUTION INTRAVENOUS; SUBCUTANEOUS
Qty: 30 ML | Refills: 0 | Status: SHIPPED | OUTPATIENT
Start: 2018-11-13 | End: 2019-05-22 | Stop reason: SDUPTHER

## 2018-11-17 PROBLEM — E11.3393 MODERATE NONPROLIFERATIVE DIABETIC RETINOPATHY OF BOTH EYES WITHOUT MACULAR EDEMA ASSOCIATED WITH TYPE 2 DIABETES MELLITUS (HCC): Status: ACTIVE | Noted: 2018-11-17

## 2018-12-04 ENCOUNTER — FOLLOW UP (OUTPATIENT)
Dept: URBAN - METROPOLITAN AREA CLINIC 44 | Facility: CLINIC | Age: 68
End: 2018-12-04

## 2018-12-04 DIAGNOSIS — Z79.4: ICD-10-CM

## 2018-12-04 DIAGNOSIS — E11.3313: ICD-10-CM

## 2018-12-04 DIAGNOSIS — Z96.1: ICD-10-CM

## 2018-12-04 DIAGNOSIS — H43.393: ICD-10-CM

## 2018-12-04 PROCEDURE — 92014 COMPRE OPH EXAM EST PT 1/>: CPT | Mod: 25

## 2018-12-04 PROCEDURE — 67028 INJECTION EYE DRUG: CPT

## 2018-12-04 PROCEDURE — 92134 CPTRZ OPH DX IMG PST SGM RTA: CPT

## 2018-12-04 ASSESSMENT — VISUAL ACUITY
OD_SC: 20/40-2
OS_SC: 20/60

## 2018-12-04 ASSESSMENT — TONOMETRY
OD_IOP_MMHG: 17
OS_IOP_MMHG: 18

## 2018-12-11 ENCOUNTER — PROCEDURE ONLY (OUTPATIENT)
Dept: URBAN - METROPOLITAN AREA CLINIC 44 | Facility: CLINIC | Age: 68
End: 2018-12-11

## 2018-12-11 DIAGNOSIS — E11.3313: ICD-10-CM

## 2018-12-11 DIAGNOSIS — Z79.4: ICD-10-CM

## 2018-12-11 PROCEDURE — 67028 INJECTION EYE DRUG: CPT

## 2018-12-11 ASSESSMENT — VISUAL ACUITY: OS_SC: 20/60+1

## 2018-12-11 ASSESSMENT — TONOMETRY: OS_IOP_MMHG: 17

## 2019-01-02 ENCOUNTER — TELEPHONE (OUTPATIENT)
Dept: FAMILY MEDICINE CLINIC | Facility: CLINIC | Age: 69
End: 2019-01-02

## 2019-01-02 DIAGNOSIS — N18.2 CHRONIC KIDNEY DISEASE (CKD), STAGE II (MILD): ICD-10-CM

## 2019-01-02 DIAGNOSIS — N18.30 CKD STAGE 3 DUE TO TYPE 2 DIABETES MELLITUS (HCC): ICD-10-CM

## 2019-01-02 DIAGNOSIS — Z11.59 ENCOUNTER FOR HEPATITIS C VIRUS SCREENING TEST FOR HIGH RISK PATIENT: Primary | ICD-10-CM

## 2019-01-02 DIAGNOSIS — Z12.5 SCREENING FOR PROSTATE CANCER: ICD-10-CM

## 2019-01-02 DIAGNOSIS — Z91.89 ENCOUNTER FOR HEPATITIS C VIRUS SCREENING TEST FOR HIGH RISK PATIENT: Primary | ICD-10-CM

## 2019-01-02 DIAGNOSIS — E78.49 OTHER HYPERLIPIDEMIA: ICD-10-CM

## 2019-01-02 DIAGNOSIS — I10 ESSENTIAL HYPERTENSION: ICD-10-CM

## 2019-01-02 DIAGNOSIS — M62.81 GENERALIZED MUSCLE WEAKNESS: ICD-10-CM

## 2019-01-02 DIAGNOSIS — I10 BENIGN ESSENTIAL HYPERTENSION: ICD-10-CM

## 2019-01-02 DIAGNOSIS — D62 ANEMIA DUE TO ACUTE BLOOD LOSS: ICD-10-CM

## 2019-01-02 DIAGNOSIS — E11.65 UNCONTROLLED TYPE 2 DIABETES MELLITUS WITH HYPERGLYCEMIA (HCC): Primary | ICD-10-CM

## 2019-01-02 DIAGNOSIS — R60.0 LOCALIZED EDEMA: ICD-10-CM

## 2019-01-02 DIAGNOSIS — E11.22 CKD STAGE 3 DUE TO TYPE 2 DIABETES MELLITUS (HCC): ICD-10-CM

## 2019-01-02 RX ORDER — CLONIDINE HYDROCHLORIDE 0.2 MG/1
TABLET ORAL
Qty: 180 TABLET | Refills: 0 | Status: SHIPPED | OUTPATIENT
Start: 2019-01-02 | End: 2019-03-08 | Stop reason: SDUPTHER

## 2019-01-02 NOTE — TELEPHONE ENCOUNTER
Pt is asking if he needs bw completed before he schedules an appointment  If so, he has other bw needing to be complete and would like to get all of his bw completed at one time  He wants to go to [facility] ER for his bw

## 2019-01-05 DIAGNOSIS — R42 VERTIGO: ICD-10-CM

## 2019-01-06 RX ORDER — MECLIZINE HYDROCHLORIDE 25 MG/1
TABLET ORAL
Qty: 30 TABLET | Refills: 5 | Status: SHIPPED | OUTPATIENT
Start: 2019-01-06 | End: 2019-01-10 | Stop reason: SDUPTHER

## 2019-01-10 DIAGNOSIS — R42 VERTIGO: ICD-10-CM

## 2019-01-11 RX ORDER — MECLIZINE HYDROCHLORIDE 25 MG/1
25 TABLET ORAL EVERY 8 HOURS PRN
Qty: 30 TABLET | Refills: 0 | Status: SHIPPED | OUTPATIENT
Start: 2019-01-11 | End: 2019-01-14 | Stop reason: SDUPTHER

## 2019-01-14 DIAGNOSIS — R42 VERTIGO: ICD-10-CM

## 2019-01-14 RX ORDER — MECLIZINE HYDROCHLORIDE 25 MG/1
25 TABLET ORAL EVERY 8 HOURS PRN
Qty: 90 TABLET | Refills: 0 | Status: SHIPPED | OUTPATIENT
Start: 2019-01-14 | End: 2019-01-28 | Stop reason: SDUPTHER

## 2019-01-22 LAB
HBA1C MFR BLD HPLC: 8.3 %
HCV AB SER-ACNC: NEGATIVE

## 2019-01-28 DIAGNOSIS — R42 VERTIGO: ICD-10-CM

## 2019-01-28 RX ORDER — MECLIZINE HYDROCHLORIDE 25 MG/1
25 TABLET ORAL EVERY 8 HOURS PRN
Qty: 270 TABLET | Refills: 0 | Status: SHIPPED | OUTPATIENT
Start: 2019-01-28

## 2019-01-30 ENCOUNTER — OFFICE VISIT (OUTPATIENT)
Dept: FAMILY MEDICINE CLINIC | Facility: CLINIC | Age: 69
End: 2019-01-30
Payer: MEDICARE

## 2019-01-30 VITALS
SYSTOLIC BLOOD PRESSURE: 110 MMHG | WEIGHT: 229 LBS | BODY MASS INDEX: 32.78 KG/M2 | HEART RATE: 85 BPM | DIASTOLIC BLOOD PRESSURE: 82 MMHG | OXYGEN SATURATION: 98 % | TEMPERATURE: 98.4 F | HEIGHT: 70 IN

## 2019-01-30 DIAGNOSIS — E11.3393 MODERATE NONPROLIFERATIVE DIABETIC RETINOPATHY OF BOTH EYES WITHOUT MACULAR EDEMA ASSOCIATED WITH TYPE 2 DIABETES MELLITUS (HCC): ICD-10-CM

## 2019-01-30 DIAGNOSIS — Z00.00 MEDICARE ANNUAL WELLNESS VISIT, SUBSEQUENT: Primary | ICD-10-CM

## 2019-01-30 DIAGNOSIS — Z79.4 TYPE 2 DIABETES MELLITUS WITH DIABETIC NEUROPATHY, WITH LONG-TERM CURRENT USE OF INSULIN (HCC): ICD-10-CM

## 2019-01-30 DIAGNOSIS — E11.65 UNCONTROLLED TYPE 2 DIABETES MELLITUS WITH HYPERGLYCEMIA (HCC): ICD-10-CM

## 2019-01-30 DIAGNOSIS — E11.40 TYPE 2 DIABETES MELLITUS WITH DIABETIC NEUROPATHY, WITH LONG-TERM CURRENT USE OF INSULIN (HCC): ICD-10-CM

## 2019-01-30 DIAGNOSIS — I10 BENIGN ESSENTIAL HYPERTENSION: ICD-10-CM

## 2019-01-30 PROBLEM — M62.81 GENERALIZED MUSCLE WEAKNESS: Status: RESOLVED | Noted: 2018-02-14 | Resolved: 2019-01-30

## 2019-01-30 PROBLEM — S70.12XS: Status: RESOLVED | Noted: 2018-02-07 | Resolved: 2019-01-30

## 2019-01-30 PROBLEM — L23.9 ALLERGIC DERMATITIS: Status: RESOLVED | Noted: 2017-10-16 | Resolved: 2019-01-30

## 2019-01-30 PROCEDURE — 99213 OFFICE O/P EST LOW 20 MIN: CPT | Performed by: FAMILY MEDICINE

## 2019-01-30 PROCEDURE — G0439 PPPS, SUBSEQ VISIT: HCPCS | Performed by: FAMILY MEDICINE

## 2019-01-30 NOTE — PATIENT INSTRUCTIONS
HOLD AMLODIPINE, /70-80  FLUIDS   HBA1C IN 3 MONTHS   8 1 WITH LAST BLOOD TEST   SCHEDULE COLONOSCOPY SCREEN WITH [address]     Wellness Visit for Adults   AMBULATORY CARE:   A wellness visit  is when you see your healthcare provider to get screened for health problems  You can also get advice on how to stay healthy  Write down your questions so you remember to ask them  Ask your healthcare provider how often you should have a wellness visit  What happens at a wellness visit:  Your healthcare provider will ask about your health, and your family history of health problems  This includes high blood pressure, heart disease, and cancer  He or she will ask if you have symptoms that concern you, if you smoke, and about your mood  You may also be asked about your intake of medicines, supplements, food, and alcohol  Any of the following may be done:  · Your weight  will be checked  Your height may also be checked so your body mass index (BMI) can be calculated  Your BMI shows if you are at a healthy weight  · Your blood pressure  and heart rate will be checked  Your temperature may also be checked  · Blood and urine tests  may be done  Blood tests may be done to check your cholesterol levels  Abnormal cholesterol levels increase your risk for heart disease and stroke  You may also need a blood or urine test to check for diabetes if you are at increased risk  Urine tests may be done to look for signs of an infection or kidney disease  · A physical exam  includes checking your heartbeat and lungs with a stethoscope  Your healthcare provider may also check your skin to look for sun damage  · Screening tests  may be recommended  A screening test is done to check for diseases that may not cause symptoms  The screening tests you may need depend on your age, gender, family history, and lifestyle habits  For example, colorectal screening may be recommended if you are 48years old or older    Screening tests you need if you are a woman:   · A Pap smear  is used to screen for cervical cancer  Pap smears are usually done every 3 to 5 years depending on your age  You may need them more often if you have had abnormal Pap smear test results in the past  Ask your healthcare provider how often you should have a Pap smear  · A mammogram  is an x-ray of your breasts to screen for breast cancer  Experts recommend mammograms every 2 years starting at age 48 years  You may need a mammogram at age 52 years or younger if you have an increased risk for breast cancer  Talk to your healthcare provider about when you should start having mammograms and how often you need them  Vaccines you may need:   · Get an influenza vaccine  every year  The influenza vaccine protects you from the flu  Several types of viruses cause the flu  The viruses change over time, so new vaccines are made each year  · Get a tetanus-diphtheria (Td) booster vaccine  every 10 years  This vaccine protects you against tetanus and diphtheria  Tetanus is a severe infection that may cause painful muscle spasms and lockjaw  Diphtheria is a severe bacterial infection that causes a thick covering in the back of your mouth and throat  · Get a human papillomavirus (HPV) vaccine  if you are female and aged 23 to 32 or male 23 to 24 and never received it  This vaccine protects you from HPV infection  HPV is the most common infection spread by sexual contact  HPV may also cause vaginal, penile, and anal cancers  · Get a pneumococcal vaccine  if you are aged 72 years or older  The pneumococcal vaccine is an injection given to protect you from pneumococcal disease  Pneumococcal disease is an infection caused by pneumococcal bacteria  The infection may cause pneumonia, meningitis, or an ear infection  · Get a shingles vaccine  if you are aged 61 or older, even if you have had shingles before   The shingles vaccine is an injection to protect you from the varicella-zoster virus  This is the same virus that causes chickenpox  Shingles is a painful rash that develops in people who had chickenpox or have been exposed to the virus  How to eat healthy:  My Plate is a model for planning healthy meals  It shows the types and amounts of foods that should go on your plate  Fruits and vegetables make up about half of your plate, and grains and protein make up the other half  A serving of dairy is included on the side of your plate  The amount of calories and serving sizes you need depends on your age, gender, weight, and height  Examples of healthy foods are listed below:  · Eat a variety of vegetables  such as dark green, red, and orange vegetables  You can also include canned vegetables low in sodium (salt) and frozen vegetables without added butter or sauces  · Eat a variety of fresh fruits , canned fruit in 100% juice, frozen fruit, and dried fruit  · Include whole grains  At least half of the grains you eat should be whole grains  Examples include whole-wheat bread, wheat pasta, brown rice, and whole-grain cereals such as oatmeal     · Eat a variety of protein foods such as seafood (fish and shellfish), lean meat, and poultry without skin (turkey and chicken)  Examples of lean meats include pork leg, shoulder, or tenderloin, and beef round, sirloin, tenderloin, and extra lean ground beef  Other protein foods include eggs and egg substitutes, beans, peas, soy products, nuts, and seeds  · Choose low-fat dairy products such as skim or 1% milk or low-fat yogurt, cheese, and cottage cheese  · Limit unhealthy fats  such as butter, hard margarine, and shortening  Exercise:  Exercise at least 30 minutes per day on most days of the week  Some examples of exercise include walking, biking, dancing, and swimming  You can also fit in more physical activity by taking the stairs instead of the elevator or parking farther away from stores   Include muscle strengthening activities 2 days each week  Regular exercise provides many health benefits  It helps you manage your weight, and decreases your risk for type 2 diabetes, heart disease, stroke, and high blood pressure  Exercise can also help improve your mood  Ask your healthcare provider about the best exercise plan for you  General health and safety guidelines:   · Do not smoke  Nicotine and other chemicals in cigarettes and cigars can cause lung damage  Ask your healthcare provider for information if you currently smoke and need help to quit  E-cigarettes or smokeless tobacco still contain nicotine  Talk to your healthcare provider before you use these products  · Limit alcohol  A drink of alcohol is 12 ounces of beer, 5 ounces of wine, or 1½ ounces of liquor  · Lose weight, if needed  Being overweight increases your risk of certain health conditions  These include heart disease, high blood pressure, type 2 diabetes, and certain types of cancer  · Protect your skin  Do not sunbathe or use tanning beds  Use sunscreen with a SPF 15 or higher  Apply sunscreen at least 15 minutes before you go outside  Reapply sunscreen every 2 hours  Wear protective clothing, hats, and sunglasses when you are outside  · Drive safely  Always wear your seatbelt  Make sure everyone in your car wears a seatbelt  A seatbelt can save your life if you are in an accident  Do not use your cell phone when you are driving  This could distract you and cause an accident  Pull over if you need to make a call or send a text message  · Practice safe sex  Use latex condoms if are sexually active and have more than one partner  Your healthcare provider may recommend screening tests for sexually transmitted infections (STIs)  · Wear helmets, lifejackets, and protective gear  Always wear a helmet when you ride a bike or motorcycle, go skiing, or play sports that could cause a head injury   Wear protective equipment when you play sports  Wear a lifejacket when you are on a boat or doing water sports  © 2017 [employer] Information is for End User's use only and may not be sold, redistributed or otherwise used for commercial purposes  All illustrations and images included in CareNotes® are the copyrighted property of [employer] M [employer]  or [employer]  The above information is an  only  It is not intended as medical advice for individual conditions or treatments  Talk to your doctor, nurse or pharmacist before following any medical regimen to see if it is safe and effective for you

## 2019-01-30 NOTE — PROGRESS NOTES
Assessment and Plan:    Problem List Items Addressed This Visit        Endocrine    Diabetes mellitus with neurological manifestation ([address] )    Relevant Medications    insulin glargine (LANTUS SOLOSTAR) 100 units/mL injection pen    Diabetes type 2, uncontrolled (HCC)    Relevant Medications    insulin glargine (LANTUS SOLOSTAR) 100 units/mL injection pen    Moderate nonproliferative diabetic retinopathy of both eyes without macular edema associated with type 2 diabetes mellitus (HCC)    Relevant Medications    insulin glargine (LANTUS SOLOSTAR) 100 units/mL injection pen       Cardiovascular and Mediastinum    Benign essential hypertension       Other    Medicare annual wellness visit, subsequent - Primary        Health Maintenance Due   Topic Date Due    Medicare Annual Wellness Visit (AWV)  1950    SLP PLAN OF CARE  1950    CRC Screening: Colonoscopy  1950    INFLUENZA VACCINE  07/01/2018         HPI:  Araceli Stewart is a 78 y o  male here for his Subsequent Wellness Visit      Patient Active Problem List   Diagnosis    Benign essential hypertension    Chronic diastolic congestive heart failure (HCC)    Chronic kidney disease (CKD), stage II (mild)    Controlled diabetes mellitus with ophthalmic complication ([address] )    Diabetes mellitus with neurological manifestation (HCC)    Diabetes type 2, uncontrolled ([address] )    Diabetic retinopathy, nonproliferative, moderate (HCC)    Edema    Gout    Hyperlipidemia    Parathyroid dysfunction (HCC)    Peripheral arterial disease (HCC)    Uncontrolled diabetes mellitus with complications ([address] )    Vitamin D deficiency    Drug-induced constipation    Accelerated hypertension    Anemia due to acute blood loss    Pulmonary nodule seen on imaging study    Other constipation    Moderate nonproliferative diabetic retinopathy of both eyes without macular edema associated with type 2 diabetes mellitus ([address] )    CKD stage 3 due to type 2 diabetes mellitus ([facility] )    Medicare annual wellness visit, subsequent     Past Medical History:   Diagnosis Date    Acute renal failure (ARF) ([place] [address] )     Last assessed: 10/10/14    Anemia due to acute blood loss     Last assessed: 12/23/15    Cellulitis of left ankle     Last assessed: 03/13/14    Duodenal ulcer     Last assessed: 12/23/15    Gangrene ([place] [address] )     due to gas  Last assessed: 01/02/14    Non-pressure chronic ulcer of skin of sites w unsp severity ([place] [address] )     of the foot  Last assessed: 05/01/14    Osteomyelitis of metatarsal (HCC)     bones  Last assessed: 01/30/14    Pulmonary embolism ([place] [address] )     Last assessed: 04/21/14    Stasis leg ulcer ([place] [address] )     Last assessed: 08/21/14     Past Surgical History:   Procedure Laterality Date    ANGIOPLASTY  01/21/2014    PTA of Aorta right, RLE r/p anterior and posterior tibial artery angioplasty    BELOW KNEE LEG AMPUTATION Left     OTHER SURGICAL HISTORY      Radiologic supervision: greenfiled dilter placement in IVC     Family History   Problem Relation Age of Onset    Breast cancer Mother     Heart failure Father     Thyroid cancer Father     Other Sister         Hodgkin's disease     History   Smoking Status    Never Smoker   Smokeless Tobacco    Never Used     History   Alcohol Use No     Comment: former    stopped 6 years ago      History   Drug use: Unknown       Current Outpatient Prescriptions   Medication Sig Dispense Refill    allopurinol (ZYLOPRIM) 300 mg tablet Take 1 5 tablets by mouth daily        amLODIPine (NORVASC) 5 mg tablet take 1 tablet by mouth once daily 90 tablet 0    atorvastatin (LIPITOR) 80 mg tablet Take 80 mg by mouth daily  0    cloNIDine (CATAPRES) 0 2 mg tablet TAKE 1 TABLET TWICE DAILY 180 tablet 0    furosemide (LASIX) 80 mg tablet Take 80 mg by mouth 2 (two) times a day      hydrALAZINE (APRESOLINE) 50 mg tablet Take 1 5 tablets by mouth 3 (three) times a day      insulin glargine (LANTUS SOLOSTAR) 100 units/mL injection pen 15 units subcutaneously at bedtime 30 mL 0    Insulin Pen Needle (NOVOFINE) 32G X 6 MM MISC 1 Syringe by Does not apply route 3 (three) times a day      Insulin Syringes, Disposable, (B-D INSULIN SYRINGE 1CC) U-100 1 ML MISC 1 Syringe by Does not apply route 3 (three) times a day      levothyroxine 25 mcg tablet Take 1 tablet (25 mcg total) by mouth daily 30 tablet 5    meclizine (ANTIVERT) 25 mg tablet Take 1 tablet (25 mg total) by mouth every 8 (eight) hours as needed for dizziness 270 tablet 0    meloxicam (MOBIC) 7 5 mg tablet TAKE 1 TABLET EVERY DAY 90 tablet 1    Multiple Vitamins-Minerals (MULTIVITAMIN ADULT PO) Take 1 tablet by mouth daily      NOVOLOG FLEXPEN 100 units/mL injection pen INJECT 8 UNITS SUBCUTANEOUSLY 3 TIMES A DAY WITH MEALS AS DIRECTED - DISCARD PEN 28 DAYS AFTER FIRST USE 30 mL 0    hydrOXYzine HCL (ATARAX) 25 mg tablet Take 0 5-1 tablets by mouth 3 (three) times a day as needed       No current facility-administered medications for this visit  No Known Allergies  Immunization History   Administered Date(s) Administered     Influenza (IM) Preservative Free 12/20/2013    Influenza 12/20/2013, 11/01/2015, 02/21/2017, 10/10/2017    Influenza Split High Dose Preservative Free IM 02/21/2017    Influenza TIV (IM) 11/01/2015, 10/10/2017    Pneumococcal Conjugate 13-Valent 12/23/2015    Pneumococcal Polysaccharide PPV23 12/20/2013       Patient Care Team:  Fausto Soto DO as PCP - General  Fausto Soto DO    Medicare Screening Tests and Risk Assessments:  Suzette Márquez is here for his Subsequent Wellness visit  Last Medicare Wellness visit information reviewed, patient interviewed and updates made to the record today  Health Risk Assessment:  Patient rates overall health as good  Patient feels that their physical health rating is Much better  Eyesight was rated as Same  Hearing was rated as Same  Patient feels that their emotional and mental health rating is Much better   Pain experienced by patient in the last 7 days has been None  Patient states that he has experienced no weight loss or gain in last 6 months  Emotional/Mental Health:  Patient has been feeling nervous/anxious  PHQ-9 Depression Screening:    Frequency of the following problems over the past two weeks:      1  Little interest or pleasure in doing things: 0 - not at all      2  Feeling down, depressed, or hopeless: 1 - several days      3  Trouble falling or staying asleep, or sleeping too much: 1 - several days      4  Feeling tired or having little energy: 2 - more than half the days      5  Poor appetite or overeatin - not at all      6  Feeling bad about yourself - or that you are a failure or have let yourself or your family down: 0 - not at all      7  Trouble concentrating on things, such as reading the newspaper or watching television: 0 - not at all      8  Moving or speaking so slowly that other people could have noticed  Or the opposite - being so fidgety or restless that you have been moving around a lot more than usual: 0 - not at all      9  Thoughts that you would be better off dead, or of hurting yourself in some way: 0 - not at all  PHQ-2 Score: 1  PHQ-9 Score: 4    Broken Bones/Falls: Fall Risk Assessment:    In the past year, patient has experienced: No history of falling in past year          Bladder/Bowel:  Patient has not leaked urine accidently in the last six months  Patient reports no loss of bowel control  Immunizations:  Patient has had a flu vaccination within the last year  Patient has received a pneumonia shot  Patient has not received a shingles shot  Patient has not received tetanus/diphtheria shot  Home Safety:  Patient does not have trouble with stairs inside or outside of their home  Patient currently reports that there are no safety hazards present in home, working smoke alarms, no working carbon monoxide detectors        Preventative Screenings:   prostate cancer screen performed, colon cancer screen completed, cholesterol screen completed, glaucoma eye exam completed, 10/30/2018      Nutrition:  Current diet: Other (please comment) with servings of the following:  (Additional Comments: BARIATRIC DIET)    Medications:  Patient is currently taking over-the-counter supplements  List of OTC medications includes: VITAMINS  Patient is able to manage medications  Lifestyle Choices:  Patient reports no tobacco use  Patient has not smoked or used tobacco in the past   Patient reports no alcohol use  Patient drives a vehicle  Patient does not wear seat belt  Current level of exercise of physical activity described by patient as: VERY MINIMUM / WALKING  Activities of Daily Living:  Can get out of bed by his or her self, able to dress self, able to make own meals, able to do own shopping, able to bathe self, can do own laundry/housekeeping, can manage own money, pay bills and track expenses    Previous Hospitalizations:  No hospitalization or ED visit in past 12 months        Advanced Directives:  Patient has decided on a power of   Patient has spoken to designated power of   Patient has completed advanced directive      Social Support: Janelle Burton     Preventative Screening/Counseling:      Cardiovascular:     Due for Labs/Analytes/Optional EKG: Lipid Panel          Diabetes:      General: Screening Current      Counseling: Improve Physical Activity and Healthy Diet          Colorectal Cancer:      Due for studies: Colonoscopy - Low Risk          Prostate Cancer:      Due for labs: PSA          Osteoporosis:      General: Screening Not Indicated          AAA:      General: Screening Not Indicated          Glaucoma:      General: Screening Current          HIV:      General: Screening Not Indicated          Hepatitis C:      General: Screening Current        Advanced Directives:   Patient has living will for healthcare, has durable POA for healthcare, patient has an advanced directive  Information on ACP and/or AD provided  5 wishes given  End of life assessment reviewed with patient  Provider agrees with end of life decisions        Immunizations:      Influenza: Influenza UTD This Year and Influenza Recommended Annually

## 2019-01-30 NOTE — PROGRESS NOTES
Subjective:   Chief Complaint   Patient presents with    Medicare Wellness Visit     PT IS HERE FOR HIS AWV AND REVIEW LABS  Patient ID: Mariella Pollard is a 78 y o  male  Patient is here for his Medicare wellness in to follow-up on his chronic medical conditions  Since his bariatric surgery, he has lost 48 pounds since his last ov in 5/2019 and states he feels great  he has CUT BACK ON LANTUS TO 15U from 30 units daily  He also uses a SLIDING SCALE OF INSULIN, UNDER 200 using 1-2 UNITS  His blood sugars are ranging between 125-150, 3 LOWS DOWN TO 58 over the past month  LOST WEIGHT AND NEEDS NEW PROSTHESIS but is planning on losing about 20-25 more lb before getting the new prosthesis  He denies any chest pains or shortness of breath  He has an appointment with Cardiology next month  He complains of intermittent dizziness especially when looking up  He uses meclizine with good relief  He denies any dizziness with positional changes however his blood pressure is low in the office today  We will stop the amlodipine and patient will monitor his blood pressure, goal 120/70-80  Patient states he still has some edema of the lower legs  He has a cardiology follow up for next month          The following portions of the patient's history were reviewed and updated as appropriate: allergies, current medications, past family history, past medical history, past social history, past surgical history and problem list     Review of Systems   Constitutional: Negative  Negative for fatigue and fever  HENT: Negative  Eyes: Negative  Respiratory: Negative  Negative for cough and shortness of breath  Cardiovascular: Negative  Negative for chest pain  Gastrointestinal: Negative  Endocrine: Negative  Genitourinary: Negative  Musculoskeletal:        Prosthesis left lower leg   Skin: Negative  Allergic/Immunologic: Negative  Neurological: Positive for dizziness  Psychiatric/Behavioral: Negative  Objective:  Vitals:    01/30/19 0914   BP: 110/82   Pulse: 85   Temp: 98 4 °F (36 9 °C)   SpO2: 98%   Weight: 104 kg (229 lb)   Height: 5' 10" (1 778 m)      Physical Exam   Constitutional: He is oriented to person, place, and time  He appears well-developed and well-nourished  HENT:   Head: Normocephalic and atraumatic  Cardiovascular: Normal rate, regular rhythm and normal heart sounds  Pulmonary/Chest: Effort normal and breath sounds normal    Abdominal: Soft  Bowel sounds are normal    Musculoskeletal: He exhibits deformity  He exhibits no tenderness  Loose fitting prosthesis left lower leg with multiple socks for padding   Neurological: He is alert and oriented to person, place, and time  Skin: Skin is warm and dry  Psychiatric: He has a normal mood and affect  His behavior is normal  Judgment and thought content normal    Nursing note and vitals reviewed  Assessment/Plan:    No problem-specific Assessment & Plan notes found for this encounter  Diagnoses and all orders for this visit:    Medicare annual wellness visit, subsequent    Uncontrolled type 2 diabetes mellitus with hyperglycemia ([facility] )  Comments:  recent hba1c 8 3, recheck in 3 months  need other blood work from specialists    Moderate nonproliferative diabetic retinopathy of both eyes without macular edema associated with type 2 diabetes mellitus ([facility] )  Comments:  keep follow up with ophthalmologist    Benign essential hypertension  Comments:  controlled, bp is low with recent weight loss, stop amlodipine, monitor with home bp cuff and keep follow up with cardiology

## 2019-02-15 ENCOUNTER — TELEPHONE (OUTPATIENT)
Dept: GASTROENTEROLOGY | Facility: CLINIC | Age: 69
End: 2019-02-15

## 2019-02-15 DIAGNOSIS — E11.69 TYPE 2 DIABETES MELLITUS WITH OTHER SPECIFIED COMPLICATION, UNSPECIFIED WHETHER LONG TERM INSULIN USE (HCC): ICD-10-CM

## 2019-02-15 DIAGNOSIS — R79.89 ABNORMAL LFTS: Primary | ICD-10-CM

## 2019-02-15 NOTE — TELEPHONE ENCOUNTER
3-4 mo recall for labs (LFT's & GGT)        Gamma Glutamyl Transpeptide    Provider:  Dr Stephany Ram  Prev labs at [facility] and [facility]

## 2019-02-19 ENCOUNTER — FOLLOW UP (OUTPATIENT)
Dept: URBAN - METROPOLITAN AREA CLINIC 44 | Facility: CLINIC | Age: 69
End: 2019-02-19

## 2019-02-19 DIAGNOSIS — Z79.4: ICD-10-CM

## 2019-02-19 DIAGNOSIS — E11.3313: ICD-10-CM

## 2019-02-19 PROCEDURE — 92134 CPTRZ OPH DX IMG PST SGM RTA: CPT

## 2019-02-19 PROCEDURE — 92012 INTRM OPH EXAM EST PATIENT: CPT | Mod: 25

## 2019-02-19 PROCEDURE — 67028 INJECTION EYE DRUG: CPT

## 2019-02-19 ASSESSMENT — VISUAL ACUITY
OS_SC: 20/60-1
OD_SC: 20/50+1

## 2019-02-19 ASSESSMENT — TONOMETRY
OS_IOP_MMHG: 17
OD_IOP_MMHG: 16

## 2019-03-01 DIAGNOSIS — E11.65 UNCONTROLLED TYPE 2 DIABETES MELLITUS WITH HYPERGLYCEMIA (HCC): Primary | ICD-10-CM

## 2019-03-05 ENCOUNTER — PROCEDURE ONLY (OUTPATIENT)
Dept: URBAN - METROPOLITAN AREA CLINIC 44 | Facility: CLINIC | Age: 69
End: 2019-03-05

## 2019-03-05 DIAGNOSIS — E11.3313: ICD-10-CM

## 2019-03-05 DIAGNOSIS — Z79.4: ICD-10-CM

## 2019-03-05 PROCEDURE — 67028 INJECTION EYE DRUG: CPT

## 2019-03-05 ASSESSMENT — TONOMETRY: OS_IOP_MMHG: 20

## 2019-03-05 ASSESSMENT — VISUAL ACUITY: OS_SC: 20/50+1

## 2019-03-05 NOTE — TELEPHONE ENCOUNTER
Spoke with pt, reviewed his recalls due  Pt states he recently had labs with his PCP and requested I check with them  Called their office and confirmed was hep C level and hemoglobin A1c  Called pt back he requested having done at [facility]  Labs printed and mailed as requested  Please sign his order, thanks

## 2019-03-08 DIAGNOSIS — I10 ESSENTIAL HYPERTENSION: ICD-10-CM

## 2019-03-11 RX ORDER — CLONIDINE HYDROCHLORIDE 0.2 MG/1
TABLET ORAL
Qty: 180 TABLET | Refills: 0 | Status: SHIPPED | OUTPATIENT
Start: 2019-03-11 | End: 2019-06-06 | Stop reason: SDUPTHER

## 2019-04-10 ENCOUNTER — TELEPHONE (OUTPATIENT)
Dept: FAMILY MEDICINE CLINIC | Facility: CLINIC | Age: 69
End: 2019-04-10

## 2019-04-10 PROBLEM — S88.112A AMPUTATION OF LEFT LOWER EXTREMITY BELOW KNEE (HCC): Status: ACTIVE | Noted: 2019-04-10

## 2019-05-20 PROBLEM — Z00.00 MEDICARE ANNUAL WELLNESS VISIT, SUBSEQUENT: Status: RESOLVED | Noted: 2019-01-30 | Resolved: 2019-05-20

## 2019-05-22 DIAGNOSIS — E11.8 TYPE 2 DIABETES MELLITUS WITH COMPLICATION, WITH LONG-TERM CURRENT USE OF INSULIN (HCC): ICD-10-CM

## 2019-05-22 DIAGNOSIS — Z79.4 TYPE 2 DIABETES MELLITUS WITH COMPLICATION, WITH LONG-TERM CURRENT USE OF INSULIN (HCC): ICD-10-CM

## 2019-05-22 RX ORDER — INSULIN ASPART 100 [IU]/ML
INJECTION, SOLUTION INTRAVENOUS; SUBCUTANEOUS
Qty: 30 ML | Refills: 0 | Status: SHIPPED | OUTPATIENT
Start: 2019-05-22 | End: 2020-01-22 | Stop reason: SDUPTHER

## 2019-06-04 ENCOUNTER — FOLLOW UP (OUTPATIENT)
Dept: URBAN - METROPOLITAN AREA CLINIC 44 | Facility: CLINIC | Age: 69
End: 2019-06-04

## 2019-06-04 DIAGNOSIS — E11.3313: ICD-10-CM

## 2019-06-04 DIAGNOSIS — Z96.1: ICD-10-CM

## 2019-06-04 DIAGNOSIS — H43.393: ICD-10-CM

## 2019-06-04 DIAGNOSIS — Z79.4: ICD-10-CM

## 2019-06-04 PROCEDURE — 92014 COMPRE OPH EXAM EST PT 1/>: CPT | Mod: 25

## 2019-06-04 PROCEDURE — 67028 INJECTION EYE DRUG: CPT

## 2019-06-04 PROCEDURE — 92250 FUNDUS PHOTOGRAPHY W/I&R: CPT

## 2019-06-04 PROCEDURE — 92134 CPTRZ OPH DX IMG PST SGM RTA: CPT

## 2019-06-04 PROCEDURE — 92235 FLUORESCEIN ANGRPH MLTIFRAME: CPT

## 2019-06-04 ASSESSMENT — TONOMETRY
OD_IOP_MMHG: 17
OS_IOP_MMHG: 17

## 2019-06-04 ASSESSMENT — VISUAL ACUITY
OD_SC: 20/40-1
OS_SC: 20/40-1

## 2019-06-06 DIAGNOSIS — M15.9 PRIMARY OSTEOARTHRITIS INVOLVING MULTIPLE JOINTS: ICD-10-CM

## 2019-06-06 DIAGNOSIS — I10 ESSENTIAL HYPERTENSION: ICD-10-CM

## 2019-06-06 RX ORDER — CLONIDINE HYDROCHLORIDE 0.2 MG/1
TABLET ORAL
Qty: 180 TABLET | Refills: 0 | Status: SHIPPED | OUTPATIENT
Start: 2019-06-06

## 2019-06-06 RX ORDER — MELOXICAM 7.5 MG/1
TABLET ORAL
Qty: 90 TABLET | Refills: 1 | Status: SHIPPED | OUTPATIENT
Start: 2019-06-06

## 2019-06-11 ENCOUNTER — PROCEDURE ONLY (OUTPATIENT)
Dept: URBAN - METROPOLITAN AREA CLINIC 44 | Facility: CLINIC | Age: 69
End: 2019-06-11

## 2019-06-11 DIAGNOSIS — E11.3313: ICD-10-CM

## 2019-06-11 DIAGNOSIS — Z79.4: ICD-10-CM

## 2019-06-11 LAB
LEFT EYE DIABETIC RETINOPATHY: NORMAL
RIGHT EYE DIABETIC RETINOPATHY: NORMAL

## 2019-06-11 PROCEDURE — 67028 INJECTION EYE DRUG: CPT

## 2019-06-11 ASSESSMENT — VISUAL ACUITY: OS_SC: 20/40-1

## 2019-06-11 ASSESSMENT — TONOMETRY: OS_IOP_MMHG: 16

## 2019-06-26 ENCOUNTER — TELEPHONE (OUTPATIENT)
Dept: FAMILY MEDICINE CLINIC | Facility: CLINIC | Age: 69
End: 2019-06-26

## 2019-06-26 ENCOUNTER — OFFICE VISIT (OUTPATIENT)
Dept: FAMILY MEDICINE CLINIC | Facility: CLINIC | Age: 69
End: 2019-06-26
Payer: MEDICARE

## 2019-06-26 VITALS
DIASTOLIC BLOOD PRESSURE: 75 MMHG | BODY MASS INDEX: 30.78 KG/M2 | TEMPERATURE: 95.4 F | SYSTOLIC BLOOD PRESSURE: 130 MMHG | HEIGHT: 70 IN | OXYGEN SATURATION: 98 % | HEART RATE: 50 BPM | WEIGHT: 215 LBS

## 2019-06-26 DIAGNOSIS — E66.9 OBESITY (BMI 30.0-34.9): ICD-10-CM

## 2019-06-26 DIAGNOSIS — E11.8 DM TYPE 2, CONTROLLED, WITH COMPLICATION (HCC): Primary | ICD-10-CM

## 2019-06-26 DIAGNOSIS — S88.112A AMPUTATION OF LEFT LOWER EXTREMITY BELOW KNEE (HCC): ICD-10-CM

## 2019-06-26 DIAGNOSIS — I10 BENIGN ESSENTIAL HYPERTENSION: ICD-10-CM

## 2019-06-26 LAB — SL AMB POCT HEMOGLOBIN AIC: 7.8 (ref ?–6.5)

## 2019-06-26 PROCEDURE — 99214 OFFICE O/P EST MOD 30 MIN: CPT | Performed by: FAMILY MEDICINE

## 2019-06-26 PROCEDURE — 83036 HEMOGLOBIN GLYCOSYLATED A1C: CPT | Performed by: FAMILY MEDICINE

## 2019-06-27 LAB
ALBUMIN/CREAT UR: 923.3 MG/G CREAT (ref 0–30)
CREAT UR-MCNC: 53.3 MG/DL
MICROALBUMIN UR-MCNC: 492.1 UG/ML

## 2019-09-03 ENCOUNTER — OFFICE VISIT (OUTPATIENT)
Dept: FAMILY MEDICINE CLINIC | Facility: CLINIC | Age: 69
End: 2019-09-03
Payer: MEDICARE

## 2019-09-03 VITALS
BODY MASS INDEX: 30.14 KG/M2 | OXYGEN SATURATION: 99 % | TEMPERATURE: 94.8 F | DIASTOLIC BLOOD PRESSURE: 60 MMHG | WEIGHT: 210.5 LBS | HEART RATE: 53 BPM | HEIGHT: 70 IN | SYSTOLIC BLOOD PRESSURE: 112 MMHG

## 2019-09-03 DIAGNOSIS — E11.40 TYPE 2 DIABETES MELLITUS WITH DIABETIC NEUROPATHY, WITH LONG-TERM CURRENT USE OF INSULIN (HCC): ICD-10-CM

## 2019-09-03 DIAGNOSIS — I73.9 PERIPHERAL ARTERIAL DISEASE (HCC): ICD-10-CM

## 2019-09-03 DIAGNOSIS — E11.42 TYPE 2 DIABETES MELLITUS WITH DIABETIC POLYNEUROPATHY, WITH LONG-TERM CURRENT USE OF INSULIN (HCC): Primary | ICD-10-CM

## 2019-09-03 DIAGNOSIS — Z79.4 TYPE 2 DIABETES MELLITUS WITH DIABETIC NEUROPATHY, WITH LONG-TERM CURRENT USE OF INSULIN (HCC): ICD-10-CM

## 2019-09-03 DIAGNOSIS — E11.3393 MODERATE NONPROLIFERATIVE DIABETIC RETINOPATHY OF BOTH EYES WITHOUT MACULAR EDEMA ASSOCIATED WITH TYPE 2 DIABETES MELLITUS (HCC): ICD-10-CM

## 2019-09-03 DIAGNOSIS — Z79.4 TYPE 2 DIABETES MELLITUS WITH DIABETIC POLYNEUROPATHY, WITH LONG-TERM CURRENT USE OF INSULIN (HCC): Primary | ICD-10-CM

## 2019-09-03 DIAGNOSIS — I50.32 CHRONIC DIASTOLIC CONGESTIVE HEART FAILURE (HCC): ICD-10-CM

## 2019-09-03 DIAGNOSIS — I10 BENIGN ESSENTIAL HYPERTENSION: ICD-10-CM

## 2019-09-03 PROBLEM — N18.2 CHRONIC KIDNEY DISEASE (CKD), STAGE II (MILD): Status: RESOLVED | Noted: 2017-11-25 | Resolved: 2019-09-03

## 2019-09-03 PROBLEM — Z98.84 STATUS POST LAPAROSCOPIC SLEEVE GASTRECTOMY: Status: ACTIVE | Noted: 2019-09-03

## 2019-09-03 PROCEDURE — 99214 OFFICE O/P EST MOD 30 MIN: CPT | Performed by: FAMILY MEDICINE

## 2019-09-03 NOTE — PROGRESS NOTES
Assessment/Plan:      Diagnoses and all orders for this visit:    Type 2 diabetes mellitus with diabetic polyneuropathy, with long-term current use of insulin ([facility] )  Comments:  hba1c 7 6 6/26, next 9/26 improving  Orders:  -     Hemoglobin A1C; Future    Moderate nonproliferative diabetic retinopathy of both eyes without macular edema associated with type 2 diabetes mellitus ([facility] )  Comments:  blood sugars improving , continue to follow up with ophthalmology    Chronic diastolic congestive heart failure ([facility] )  Comments:  stable    Peripheral arterial disease ([facility] )  Comments:  stable,     Benign essential hypertension  Comments:  controlled          Subjective:  Chief Complaint   Patient presents with    Follow-up     pt is here today for his follow-up on his chronic conditions  Last a1c was 7 8 on 6/26  Patient ID: Tejal Giraldo is a 78 y o  male  Pt here for diabetic check  Both eyes seem cloudy, has gotten injections in both eyes  Dr Maurizio Treviño in [address]  Blood sugars 125-150 during the day when checking  1 episode of low blood sugar in the evening 50  Testing 3 times a day  He is feeling good  No recent illnesses  Will get influenza immunization   No episodes of gout, has been controlled  Seen by cardiologist   Lost an additional 5 pounds since last ov after weight loss surgery and continuing to lose weight  Review of Systems   Constitutional: Negative  Negative for fatigue and fever  HENT: Negative  Eyes: Negative  Respiratory: Negative  Negative for cough  Cardiovascular: Negative  Gastrointestinal: Negative  Endocrine: Negative  Genitourinary: Negative  Musculoskeletal: Negative  Skin: Negative  Allergic/Immunologic: Negative  Neurological: Negative  Psychiatric/Behavioral: Negative            The following portions of the patient's history were reviewed and updated as appropriate: allergies, current medications, past family history, past medical history, past social history, past surgical history and problem list     Objective:  Vitals:    09/03/19 0802   BP: 112/60   Pulse: (!) 53   Temp: (!) 94 8 °F (34 9 °C)   SpO2: 99%   Weight: 95 5 kg (210 lb 8 oz)   Height: 5' 10" (1 778 m)      Physical Exam   Constitutional: He is oriented to person, place, and time  He appears well-developed and well-nourished  HENT:   Head: Normocephalic and atraumatic  Cardiovascular: Regular rhythm, normal heart sounds and intact distal pulses  bradycardia   Pulmonary/Chest: Effort normal and breath sounds normal    Abdominal: Soft  Bowel sounds are normal    Musculoskeletal: He exhibits deformity  Amputation left leg   Neurological: He is alert and oriented to person, place, and time  Skin: Skin is warm and dry  Psychiatric: He has a normal mood and affect  His behavior is normal  Judgment and thought content normal    Nursing note and vitals reviewed

## 2019-09-10 ENCOUNTER — FOLLOW UP (OUTPATIENT)
Dept: URBAN - METROPOLITAN AREA CLINIC 44 | Facility: CLINIC | Age: 69
End: 2019-09-10

## 2019-09-10 DIAGNOSIS — E11.3313: ICD-10-CM

## 2019-09-10 DIAGNOSIS — Z79.4: ICD-10-CM

## 2019-09-10 PROCEDURE — 92012 INTRM OPH EXAM EST PATIENT: CPT

## 2019-09-10 PROCEDURE — 92134 CPTRZ OPH DX IMG PST SGM RTA: CPT

## 2019-09-10 ASSESSMENT — TONOMETRY
OS_IOP_MMHG: 16
OD_IOP_MMHG: 14

## 2019-09-10 ASSESSMENT — VISUAL ACUITY
OS_SC: 20/70
OD_SC: 20/40

## 2019-10-09 DIAGNOSIS — Z71.89 COMPLEX CARE COORDINATION: Primary | ICD-10-CM

## 2019-10-11 ENCOUNTER — PATIENT OUTREACH (OUTPATIENT)
Dept: FAMILY MEDICINE CLINIC | Facility: CLINIC | Age: 69
End: 2019-10-11

## 2019-11-22 ENCOUNTER — TELEPHONE (OUTPATIENT)
Dept: FAMILY MEDICINE CLINIC | Facility: CLINIC | Age: 69
End: 2019-11-22

## 2019-11-22 NOTE — TELEPHONE ENCOUNTER
Call from cardiologist   Pt had 3 vessel cardiac disease- with bypass  Had fluid overload,   Now on dialysis

## 2019-12-12 LAB — HBA1C MFR BLD HPLC: 7.2 %

## 2020-01-22 DIAGNOSIS — E11.8 TYPE 2 DIABETES MELLITUS WITH COMPLICATION, WITH LONG-TERM CURRENT USE OF INSULIN (HCC): ICD-10-CM

## 2020-01-22 DIAGNOSIS — Z79.4 TYPE 2 DIABETES MELLITUS WITH COMPLICATION, WITH LONG-TERM CURRENT USE OF INSULIN (HCC): ICD-10-CM

## 2020-01-30 ENCOUNTER — TELEPHONE (OUTPATIENT)
Dept: FAMILY MEDICINE CLINIC | Facility: CLINIC | Age: 70
End: 2020-01-30

## 2020-01-30 NOTE — TELEPHONE ENCOUNTER
Needs rx sent to [facility] - fax     Phone     He needs a leg socket for lower leg  Pt lost 113 lbs and the other one that he had does not fit anymore  He needs to get refitted      [phone]

## 2020-04-01 DIAGNOSIS — Z79.4 TYPE 2 DIABETES MELLITUS WITH DIABETIC NEUROPATHY, WITH LONG-TERM CURRENT USE OF INSULIN (HCC): ICD-10-CM

## 2020-04-01 DIAGNOSIS — E11.40 TYPE 2 DIABETES MELLITUS WITH DIABETIC NEUROPATHY, WITH LONG-TERM CURRENT USE OF INSULIN (HCC): ICD-10-CM

## 2020-04-16 ENCOUNTER — TELEPHONE (OUTPATIENT)
Dept: FAMILY MEDICINE CLINIC | Facility: CLINIC | Age: 70
End: 2020-04-16

## 2020-04-16 RX ORDER — APIXABAN 5 MG/1
5 TABLET, FILM COATED ORAL 2 TIMES DAILY
COMMUNITY
Start: 2020-02-22

## 2020-08-10 DIAGNOSIS — Z79.4 TYPE 2 DIABETES MELLITUS WITH COMPLICATION, WITH LONG-TERM CURRENT USE OF INSULIN (HCC): ICD-10-CM

## 2020-08-10 DIAGNOSIS — E11.8 TYPE 2 DIABETES MELLITUS WITH COMPLICATION, WITH LONG-TERM CURRENT USE OF INSULIN (HCC): ICD-10-CM

## 2020-08-12 DIAGNOSIS — I50.32 CHRONIC DIASTOLIC CONGESTIVE HEART FAILURE (HCC): ICD-10-CM

## 2020-08-12 DIAGNOSIS — E11.22 CKD STAGE 3 DUE TO TYPE 2 DIABETES MELLITUS (HCC): Primary | ICD-10-CM

## 2020-08-12 DIAGNOSIS — D62 ANEMIA DUE TO ACUTE BLOOD LOSS: ICD-10-CM

## 2020-08-12 DIAGNOSIS — I10 BENIGN ESSENTIAL HYPERTENSION: ICD-10-CM

## 2020-08-12 DIAGNOSIS — E11.8 DM TYPE 2, CONTROLLED, WITH COMPLICATION (HCC): ICD-10-CM

## 2020-08-12 DIAGNOSIS — N18.30 CKD STAGE 3 DUE TO TYPE 2 DIABETES MELLITUS (HCC): Primary | ICD-10-CM

## 2020-08-12 DIAGNOSIS — E78.49 OTHER HYPERLIPIDEMIA: ICD-10-CM

## 2020-08-12 RX ORDER — MIDODRINE HYDROCHLORIDE 5 MG/1
TABLET ORAL
COMMUNITY
Start: 2020-06-05

## 2020-08-12 RX ORDER — OXYCODONE HYDROCHLORIDE 5 MG/1
TABLET ORAL
COMMUNITY
Start: 2020-06-15

## 2020-08-12 NOTE — TELEPHONE ENCOUNTER
Requested medication(s) are due for refill today: y  Patient has already received a courtesy refill: n  Other reason request has been forwarded to provider: red stop    Last ov 09/03/2019   fbw due

## 2020-08-13 NOTE — TELEPHONE ENCOUNTER
Pt due for office visit and blood work  Does he want to get blood work at [facility]) will need orders  He could schedule here on a Thursday am  He has medicare    He is due for hba1c

## 2020-08-14 NOTE — TELEPHONE ENCOUNTER
Pt said new for sending that over cause he is out of meds but he is not you pt anymore he is seeing another pcp